# Patient Record
Sex: MALE | Race: OTHER | Employment: FULL TIME | URBAN - METROPOLITAN AREA
[De-identification: names, ages, dates, MRNs, and addresses within clinical notes are randomized per-mention and may not be internally consistent; named-entity substitution may affect disease eponyms.]

---

## 2018-01-26 ENCOUNTER — OFFICE VISIT (OUTPATIENT)
Dept: INTERNAL MEDICINE CLINIC | Facility: CLINIC | Age: 33
End: 2018-01-26

## 2018-01-26 VITALS
HEIGHT: 73 IN | HEART RATE: 81 BPM | WEIGHT: 204 LBS | BODY MASS INDEX: 27.04 KG/M2 | DIASTOLIC BLOOD PRESSURE: 70 MMHG | SYSTOLIC BLOOD PRESSURE: 138 MMHG

## 2018-01-26 DIAGNOSIS — Z00.00 ANNUAL PHYSICAL EXAM: Primary | ICD-10-CM

## 2018-01-26 DIAGNOSIS — E78.5 HYPERLIPIDEMIA, UNSPECIFIED HYPERLIPIDEMIA TYPE: ICD-10-CM

## 2018-01-26 DIAGNOSIS — L98.9 SKIN LESION OF BACK: ICD-10-CM

## 2018-01-26 DIAGNOSIS — K21.9 GASTROESOPHAGEAL REFLUX DISEASE, ESOPHAGITIS PRESENCE NOT SPECIFIED: ICD-10-CM

## 2018-01-26 PROCEDURE — 99395 PREV VISIT EST AGE 18-39: CPT | Performed by: INTERNAL MEDICINE

## 2018-01-26 RX ORDER — CHLORAL HYDRATE 500 MG
1000 CAPSULE ORAL DAILY
COMMUNITY
End: 2019-12-08

## 2018-01-26 RX ORDER — OMEPRAZOLE 40 MG/1
40 CAPSULE, DELAYED RELEASE ORAL DAILY
COMMUNITY
End: 2019-04-05

## 2018-01-26 RX ORDER — BIOTIN 1 MG
1 TABLET ORAL DAILY
COMMUNITY
End: 2019-12-08

## 2018-01-26 NOTE — PROGRESS NOTES
HPI:    Patient ID: Sita Thomason is a 28year old male.     HPI  Patient comes in today for first time to establish care and annual physical exam patient has history of GERD with possible Logan's esophagitis or the beginning of it that he was told i HERNIA SURGERY   Family History   Problem Relation Age of Onset   • Diabetes Mother    • Diabetes Maternal Grandmother    • Heart Disorder Maternal Grandmother       Social History: Smoking status: Never Smoker this encounter       Imaging & Referrals:  [facility] - INTERNAL  [facility] - INTERNAL        RE#[id number]

## 2018-03-08 ENCOUNTER — OFFICE VISIT (OUTPATIENT)
Dept: DERMATOLOGY CLINIC | Facility: CLINIC | Age: 33
End: 2018-03-08

## 2018-03-08 DIAGNOSIS — D18.01 HEMANGIOMA OF SKIN AND SUBCUTANEOUS TISSUE: ICD-10-CM

## 2018-03-08 DIAGNOSIS — D23.5 BENIGN NEOPLASM OF SKIN OF TRUNK, EXCEPT SCROTUM: ICD-10-CM

## 2018-03-08 DIAGNOSIS — D48.5 NEOPLASM OF UNCERTAIN BEHAVIOR OF SKIN: Primary | ICD-10-CM

## 2018-03-08 DIAGNOSIS — T14.8XXA EXCORIATION: ICD-10-CM

## 2018-03-08 PROCEDURE — 11100 BIOPSY OF SKIN LESION: CPT | Performed by: DERMATOLOGY

## 2018-03-08 PROCEDURE — 99212 OFFICE O/P EST SF 10 MIN: CPT | Performed by: DERMATOLOGY

## 2018-03-08 PROCEDURE — 88305 TISSUE EXAM BY PATHOLOGIST: CPT | Performed by: DERMATOLOGY

## 2018-03-08 PROCEDURE — 99202 OFFICE O/P NEW SF 15 MIN: CPT | Performed by: DERMATOLOGY

## 2018-03-08 RX ORDER — RANITIDINE 150 MG/1
150 CAPSULE ORAL 2 TIMES DAILY
COMMUNITY
End: 2019-12-08

## 2018-03-08 NOTE — PROGRESS NOTES
HPI:     Chief Complaint     Lesion        HPI     Lesion    Additional comments: New pt. Pt presents with lesion on lower back for x months, pt states lesion is sensitive to touch, otherwise no symptoms. Pt denies personal and family hx of skin cancer. education: N/A  Number of children: N/A     Occupational History  None on file     Social History Main Topics   Smoking status: Never Smoker    Smokeless tobacco: Never Used    Alcohol use Yes    Comment: socially    Drug use: No    Sexual activity: Not on that  it is absorbed and working and to reapply it every few hours.       Orders Placed This Encounter      BIOPSY OF SKIN LESION      Specimen to Pathology, Tissue [[facility] Pt to [facility] lab]    Results From Past 48 Hours:  No results found for this or any prev

## 2018-03-08 NOTE — PROGRESS NOTES
Past Medical History:   Diagnosis Date   • Esophageal reflux      Past Surgical History:  No date: HERNIA SURGERY    Social History  Social History   Marital status: Single  Spouse name: N/A    Years of education: N/A  Number of children: N/A     Occupatio

## 2018-04-11 ENCOUNTER — OFFICE VISIT (OUTPATIENT)
Dept: GASTROENTEROLOGY | Facility: CLINIC | Age: 33
End: 2018-04-11

## 2018-04-11 VITALS
HEART RATE: 87 BPM | HEIGHT: 72.05 IN | SYSTOLIC BLOOD PRESSURE: 123 MMHG | DIASTOLIC BLOOD PRESSURE: 74 MMHG | WEIGHT: 198 LBS | BODY MASS INDEX: 26.82 KG/M2

## 2018-04-11 DIAGNOSIS — K21.00 GASTROESOPHAGEAL REFLUX DISEASE WITH ESOPHAGITIS: Primary | ICD-10-CM

## 2018-04-11 PROCEDURE — 99243 OFF/OP CNSLTJ NEW/EST LOW 30: CPT | Performed by: INTERNAL MEDICINE

## 2018-04-11 PROCEDURE — 99212 OFFICE O/P EST SF 10 MIN: CPT | Performed by: INTERNAL MEDICINE

## 2018-04-15 NOTE — PROGRESS NOTES
HPI:    Patient ID: Etelvina Carmen is a 35year old male. HPI  The patient is referred by Dr. Edgard Fu. A few years prior the patient presented with \"massive heartburn\" that would occur on \"a constant basis\".   He underwent an upper endoscopy Prescriptions:  RaNITidine HCl 150 MG Oral Cap Take 150 mg by mouth 2 (two) times daily. Disp:  Rfl:    Omeprazole 40 MG Oral Capsule Delayed Release Take 40 mg by mouth daily.  Disp:  Rfl:    Multiple Vitamins-Minerals (MENS MULTI VITAMIN & MINERAL) Oral T biopsies of the esophagus were negative for ulceration or intestinal metaplasia. With dietary and lifestyle modification, the patient's symptoms have significantly improved.   His acid suppression has been lowered from 40 mg daily of omeprazole to 150 mg o

## 2018-04-15 NOTE — PATIENT INSTRUCTIONS
1.  Continue dietary and lifestyle modification for reflux. 2.  Please forward a copy of your endoscopic photographs to me for review. 3.  May stop the ranitidine and assess symptoms. 3.  Please contact me with recurrent symptoms.

## 2018-06-12 ENCOUNTER — OFFICE VISIT (OUTPATIENT)
Dept: INTERNAL MEDICINE CLINIC | Facility: CLINIC | Age: 33
End: 2018-06-12

## 2018-06-12 VITALS
HEART RATE: 78 BPM | SYSTOLIC BLOOD PRESSURE: 132 MMHG | DIASTOLIC BLOOD PRESSURE: 78 MMHG | BODY MASS INDEX: 26.51 KG/M2 | WEIGHT: 200 LBS | HEIGHT: 73 IN | TEMPERATURE: 98 F | RESPIRATION RATE: 18 BRPM

## 2018-06-12 DIAGNOSIS — H93.8X1 CONGESTION OF RIGHT EAR: ICD-10-CM

## 2018-06-12 DIAGNOSIS — H66.90 ACUTE OTITIS MEDIA, UNSPECIFIED OTITIS MEDIA TYPE: Primary | ICD-10-CM

## 2018-06-12 PROBLEM — K21.9 GERD (GASTROESOPHAGEAL REFLUX DISEASE): Status: ACTIVE | Noted: 2018-06-12

## 2018-06-12 PROCEDURE — 99212 OFFICE O/P EST SF 10 MIN: CPT | Performed by: INTERNAL MEDICINE

## 2018-06-12 PROCEDURE — 99213 OFFICE O/P EST LOW 20 MIN: CPT | Performed by: INTERNAL MEDICINE

## 2018-06-12 RX ORDER — AZITHROMYCIN 250 MG/1
TABLET, FILM COATED ORAL
Qty: 6 TABLET | Refills: 0 | Status: SHIPPED | OUTPATIENT
Start: 2018-06-12 | End: 2018-09-13 | Stop reason: ALTCHOICE

## 2018-06-12 NOTE — PATIENT INSTRUCTIONS
ASSESSMENT/PLAN:   Acute otitis media, unspecified otitis media type  (primary encounter diagnosis)-will cover with antibiotics Z-Dean take as prescribed let us know if not better to see ENT if not better  Congestion of right ear as need decongestant

## 2018-06-12 NOTE — PROGRESS NOTES
HPI:    Patient ID: Susana Blount is a 35year old male.     HPI  Patient comes in with complaint of right ear feeling clogged and some discomfort and pain he went initially to immediate care a few days ago was given some eardrops continues with the sa Constitutional: He is oriented to person, place, and time. He appears well-developed and well-nourished. HENT:   Head: Normocephalic and atraumatic.    Rt ear swelling TM ,    Eyes: Conjunctivae and EOM are normal. Pupils are equal, round, and reactive

## 2018-09-13 ENCOUNTER — OFFICE VISIT (OUTPATIENT)
Dept: AUDIOLOGY | Facility: CLINIC | Age: 33
End: 2018-09-13
Payer: COMMERCIAL

## 2018-09-13 ENCOUNTER — OFFICE VISIT (OUTPATIENT)
Dept: OTOLARYNGOLOGY | Facility: CLINIC | Age: 33
End: 2018-09-13
Payer: COMMERCIAL

## 2018-09-13 VITALS
WEIGHT: 200 LBS | BODY MASS INDEX: 26.51 KG/M2 | SYSTOLIC BLOOD PRESSURE: 129 MMHG | DIASTOLIC BLOOD PRESSURE: 81 MMHG | TEMPERATURE: 99 F | HEIGHT: 73 IN

## 2018-09-13 DIAGNOSIS — H90.11 CONDUCTIVE HEARING LOSS OF RIGHT EAR WITH UNRESTRICTED HEARING OF LEFT EAR: Primary | ICD-10-CM

## 2018-09-13 DIAGNOSIS — H93.11 TINNITUS OF RIGHT EAR: Primary | ICD-10-CM

## 2018-09-13 PROCEDURE — 92567 TYMPANOMETRY: CPT | Performed by: AUDIOLOGIST

## 2018-09-13 PROCEDURE — 99243 OFF/OP CNSLTJ NEW/EST LOW 30: CPT | Performed by: OTOLARYNGOLOGY

## 2018-09-13 PROCEDURE — 92557 COMPREHENSIVE HEARING TEST: CPT | Performed by: AUDIOLOGIST

## 2018-09-13 PROCEDURE — 99212 OFFICE O/P EST SF 10 MIN: CPT | Performed by: OTOLARYNGOLOGY

## 2018-09-13 RX ORDER — METHYLPREDNISOLONE 4 MG/1
TABLET ORAL
Qty: 1 PACKAGE | Refills: 0 | Status: SHIPPED | OUTPATIENT
Start: 2018-09-13 | End: 2019-04-05

## 2018-09-13 RX ORDER — FLUTICASONE PROPIONATE 50 MCG
2 SPRAY, SUSPENSION (ML) NASAL DAILY
Qty: 1 BOTTLE | Refills: 3 | Status: SHIPPED | OUTPATIENT
Start: 2018-09-13 | End: 2019-04-05

## 2018-09-13 NOTE — PROGRESS NOTES
AUDIOGRAM     Harpreet Sanchez [facility] was referred for testing by Jaymie Newman V for clogged feeling in the right ear. 3/31/1985  [id number]      Otoscopic inspection: right ear no cerumen; left ear no cerumen.        Tests/Procedures  Patient was tested

## 2018-09-13 NOTE — PROGRESS NOTES
Evans Blanco is a 35year old male.  Patient presents with:  Ear Problem: clogged right ear and ringing for a few months, pt was oral antibiotics and ear drops with no change in symptoms     HPI:   He has been experiencing a problem with fullness in h External nose - Normal. Nasal septum -deviated to the right  turbinates - Normal   Neurological Normal Memory - Normal. Cranial nerves - Cranial nerves II through XII grossly intact.    Neck Exam Normal Inspection - Normal. Palpation - Normal. Parotid gland

## 2018-10-05 ENCOUNTER — OFFICE VISIT (OUTPATIENT)
Dept: OTOLARYNGOLOGY | Facility: CLINIC | Age: 33
End: 2018-10-05
Payer: COMMERCIAL

## 2018-10-05 VITALS — DIASTOLIC BLOOD PRESSURE: 80 MMHG | SYSTOLIC BLOOD PRESSURE: 127 MMHG | HEART RATE: 80 BPM

## 2018-10-05 DIAGNOSIS — H73.891 RETRACTION POCKET OF TYMPANIC MEMBRANE, RIGHT: Primary | ICD-10-CM

## 2018-10-05 PROCEDURE — 99212 OFFICE O/P EST SF 10 MIN: CPT | Performed by: OTOLARYNGOLOGY

## 2018-10-05 PROCEDURE — 99213 OFFICE O/P EST LOW 20 MIN: CPT | Performed by: OTOLARYNGOLOGY

## 2018-10-05 NOTE — PROGRESS NOTES
Loy Burton is a 35year old male.  Patient presents with:  Ear Problem: tinnnitus f/u ,per pt slight improvement, pt still has some pressure and ringing in ear    HPI:   He feels that the ringing in his ear has improved only slightly in the fullness Normal   Neurological Normal Memory - Normal. Cranial nerves - Cranial nerves II through XII grossly intact.    Neck Exam Normal Inspection - Normal. Palpation - Normal. Parotid gland - Normal. Thyroid gland - Normal.   Psychiatric Normal Orientation - Marcelle Berman

## 2019-04-05 ENCOUNTER — OFFICE VISIT (OUTPATIENT)
Dept: AUDIOLOGY | Facility: CLINIC | Age: 34
End: 2019-04-05
Payer: COMMERCIAL

## 2019-04-05 ENCOUNTER — OFFICE VISIT (OUTPATIENT)
Dept: OTOLARYNGOLOGY | Facility: CLINIC | Age: 34
End: 2019-04-05
Payer: COMMERCIAL

## 2019-04-05 ENCOUNTER — OFFICE VISIT (OUTPATIENT)
Dept: INTERNAL MEDICINE CLINIC | Facility: CLINIC | Age: 34
End: 2019-04-05
Payer: COMMERCIAL

## 2019-04-05 VITALS
BODY MASS INDEX: 29.03 KG/M2 | HEIGHT: 73 IN | OXYGEN SATURATION: 99 % | SYSTOLIC BLOOD PRESSURE: 125 MMHG | HEART RATE: 75 BPM | RESPIRATION RATE: 17 BRPM | DIASTOLIC BLOOD PRESSURE: 82 MMHG | TEMPERATURE: 98 F | WEIGHT: 219 LBS

## 2019-04-05 DIAGNOSIS — K21.9 GASTROESOPHAGEAL REFLUX DISEASE, ESOPHAGITIS PRESENCE NOT SPECIFIED: ICD-10-CM

## 2019-04-05 DIAGNOSIS — H73.891 RETRACTION POCKET OF TYMPANIC MEMBRANE, RIGHT: ICD-10-CM

## 2019-04-05 DIAGNOSIS — Z00.00 ANNUAL PHYSICAL EXAM: Primary | ICD-10-CM

## 2019-04-05 DIAGNOSIS — H91.93 BILATERAL HEARING LOSS, UNSPECIFIED HEARING LOSS TYPE: Primary | ICD-10-CM

## 2019-04-05 DIAGNOSIS — H90.11 CONDUCTIVE HEARING LOSS OF RIGHT EAR WITH UNRESTRICTED HEARING OF LEFT EAR: Primary | ICD-10-CM

## 2019-04-05 DIAGNOSIS — R10.12 ABDOMINAL DISCOMFORT IN LEFT UPPER QUADRANT: ICD-10-CM

## 2019-04-05 PROCEDURE — 99213 OFFICE O/P EST LOW 20 MIN: CPT | Performed by: OTOLARYNGOLOGY

## 2019-04-05 PROCEDURE — 99212 OFFICE O/P EST SF 10 MIN: CPT | Performed by: OTOLARYNGOLOGY

## 2019-04-05 PROCEDURE — 99395 PREV VISIT EST AGE 18-39: CPT | Performed by: INTERNAL MEDICINE

## 2019-04-05 PROCEDURE — 92567 TYMPANOMETRY: CPT | Performed by: AUDIOLOGIST

## 2019-04-05 PROCEDURE — 92557 COMPREHENSIVE HEARING TEST: CPT | Performed by: AUDIOLOGIST

## 2019-04-05 RX ORDER — TURMERIC ROOT EXTRACT 500 MG
TABLET ORAL
COMMUNITY
Start: 2019-03-01 | End: 2019-12-08

## 2019-04-05 RX ORDER — FAMOTIDINE 10 MG/ML
INJECTION, SOLUTION INTRAVENOUS
COMMUNITY
Start: 2019-01-01 | End: 2019-12-08

## 2019-04-05 NOTE — PROGRESS NOTES
Barbara Nurse is a 29year old male. Patient presents with:  Ear Problem: 6 month follow up on right ear: [pt reports no improvement    HPI:   He is in follow-up of an attic retraction in his right ear.   He still feels that the ear feels up at times w Nasal Normal External nose - Normal. Nasal septum - Normal, Turbinates - Normal   Neurological Normal Memory - Normal. Cranial nerves - Cranial nerves II through XII grossly intact.    Neck Exam Normal Inspection - Normal. Palpation - Normal. Parotid gla

## 2019-04-05 NOTE — PROGRESS NOTES
HPI:    Patient ID: Tala Mcdonald is a 29year old male. HPI  Okay he is GERD symptoms are much better he is watching his diet he is only taking famotidine as needed he had seen GI.   Patient also with chronic right ear discomfort has seen ENT only SWELLING    HISTORY:  Past Medical History:   Diagnosis Date   • Esophageal reflux       Past Surgical History:   Procedure Laterality Date   • EGD  03/2015    hiatal hernia @ [facility]   • HERNIA SURGERY        Family History   Problem Relation Ag likely was muscular possible injury or muscle strain benign exam no bulging so does not look like a hernia    Orders Placed This Encounter      CBC W Differential W Platelet [E] Normal      Comp Metabolic Panel (14) [E] Normal      Lipid Panel [E], normal

## 2019-04-08 ENCOUNTER — LAB ENCOUNTER (OUTPATIENT)
Dept: LAB | Facility: HOSPITAL | Age: 34
End: 2019-04-08
Attending: INTERNAL MEDICINE
Payer: COMMERCIAL

## 2019-04-08 DIAGNOSIS — R73.09 ELEVATED GLUCOSE: ICD-10-CM

## 2019-04-08 DIAGNOSIS — Z00.00 ANNUAL PHYSICAL EXAM: ICD-10-CM

## 2019-04-08 PROCEDURE — 85025 COMPLETE CBC W/AUTO DIFF WBC: CPT

## 2019-04-08 PROCEDURE — 81003 URINALYSIS AUTO W/O SCOPE: CPT

## 2019-04-08 PROCEDURE — 83721 ASSAY OF BLOOD LIPOPROTEIN: CPT

## 2019-04-08 PROCEDURE — 80061 LIPID PANEL: CPT

## 2019-04-08 PROCEDURE — 84443 ASSAY THYROID STIM HORMONE: CPT

## 2019-04-08 PROCEDURE — 36415 COLL VENOUS BLD VENIPUNCTURE: CPT

## 2019-04-08 PROCEDURE — 83036 HEMOGLOBIN GLYCOSYLATED A1C: CPT

## 2019-04-08 PROCEDURE — 80053 COMPREHEN METABOLIC PANEL: CPT

## 2019-04-08 NOTE — PROGRESS NOTES
The BS elevated  and cholesterol and triglycerides are very high, will need to watch diet, cut down on carbs, rice, pasta, potatoes, one cup per serving, bread 2 slices wheat better than white, no sodas or othr sugary drinks, cut down on fried fatty food,

## 2019-04-09 NOTE — PROGRESS NOTES
AUDIOLOGY REPORT      Harpreet Gilmore is a 29year old male     Referring Provider: Larissa Hebert   YOB: 1985  Medical Record: [id number]      Patient Hearing History:  Patient is here for follow up audiogram.   Previous hearing testing w

## 2019-08-23 ENCOUNTER — PATIENT MESSAGE (OUTPATIENT)
Dept: INTERNAL MEDICINE CLINIC | Facility: CLINIC | Age: 34
End: 2019-08-23

## 2019-08-23 DIAGNOSIS — E78.5 HYPERLIPIDEMIA, UNSPECIFIED HYPERLIPIDEMIA TYPE: Primary | ICD-10-CM

## 2019-08-23 NOTE — TELEPHONE ENCOUNTER
From: Teresia Goldberg  To: Yenifer Casarez MD  Sent: 8/23/2019 8:09 AM CDT  Subject: Visit Follow-up Question    Hi Dr Fischer Shoulder:    It has been three months since my blood was drawn to test triglyceride levels. I have been eating healthy and exercising.

## 2019-08-23 NOTE — TELEPHONE ENCOUNTER
pls see pt mychart message below and advise if ok for a Lipid panel.  I have pended the order for your review and approval.

## 2019-08-27 ENCOUNTER — TELEPHONE (OUTPATIENT)
Dept: INTERNAL MEDICINE CLINIC | Facility: CLINIC | Age: 34
End: 2019-08-27

## 2019-08-27 ENCOUNTER — LAB ENCOUNTER (OUTPATIENT)
Dept: LAB | Facility: HOSPITAL | Age: 34
End: 2019-08-27
Attending: INTERNAL MEDICINE
Payer: COMMERCIAL

## 2019-08-27 DIAGNOSIS — E78.5 HYPERLIPIDEMIA: Primary | ICD-10-CM

## 2019-08-27 LAB
CHOLEST SMN-MCNC: 242 MG/DL (ref ?–200)
HDLC SERPL-MCNC: 39 MG/DL (ref 40–59)
LDLC SERPL CALC-MCNC: 156 MG/DL (ref ?–100)
NONHDLC SERPL-MCNC: 203 MG/DL (ref ?–130)
PATIENT FASTING: YES
TRIGL SERPL-MCNC: 235 MG/DL (ref 30–149)
VLDLC SERPL CALC-MCNC: 47 MG/DL (ref 0–30)

## 2019-08-27 PROCEDURE — 80061 LIPID PANEL: CPT

## 2019-08-27 PROCEDURE — 36415 COLL VENOUS BLD VENIPUNCTURE: CPT

## 2019-08-27 NOTE — TELEPHONE ENCOUNTER
Belle from [address] states the patient is still there waiting to have Labs done been there since 9:40 am this morning         Please advise     # [phone]

## 2019-08-27 NOTE — TELEPHONE ENCOUNTER
patient is currently at [facility] for labs as instruction please sign pended order for lipids

## 2019-08-27 NOTE — TELEPHONE ENCOUNTER
Belle from the [facility] lab states the lab needs an order for the lipid panel to be drawn, the patient has already had it drawn per Dr. Houston Late order. Order signed per Dr. Khanh Ruvalcaba.

## 2019-09-17 ENCOUNTER — MED REC SCAN ONLY (OUTPATIENT)
Dept: INTERNAL MEDICINE CLINIC | Facility: CLINIC | Age: 34
End: 2019-09-17

## 2019-09-17 ENCOUNTER — OFFICE VISIT (OUTPATIENT)
Dept: INTERNAL MEDICINE CLINIC | Facility: CLINIC | Age: 34
End: 2019-09-17
Payer: COMMERCIAL

## 2019-09-17 VITALS
TEMPERATURE: 98 F | HEIGHT: 73 IN | DIASTOLIC BLOOD PRESSURE: 85 MMHG | SYSTOLIC BLOOD PRESSURE: 143 MMHG | WEIGHT: 195 LBS | OXYGEN SATURATION: 99 % | BODY MASS INDEX: 25.84 KG/M2 | RESPIRATION RATE: 18 BRPM | HEART RATE: 82 BPM

## 2019-09-17 DIAGNOSIS — R10.32 ABDOMINAL PAIN, LLQ: Primary | ICD-10-CM

## 2019-09-17 PROBLEM — R10.12 ABDOMINAL PAIN, CHRONIC, LEFT UPPER QUADRANT: Status: ACTIVE | Noted: 2019-09-17

## 2019-09-17 PROBLEM — G89.29 ABDOMINAL PAIN, CHRONIC, LEFT UPPER QUADRANT: Status: ACTIVE | Noted: 2019-09-17

## 2019-09-17 PROCEDURE — 99213 OFFICE O/P EST LOW 20 MIN: CPT | Performed by: INTERNAL MEDICINE

## 2019-09-17 PROCEDURE — 90686 IIV4 VACC NO PRSV 0.5 ML IM: CPT | Performed by: INTERNAL MEDICINE

## 2019-09-17 PROCEDURE — 90471 IMMUNIZATION ADMIN: CPT | Performed by: INTERNAL MEDICINE

## 2019-09-23 NOTE — PROGRESS NOTES
HPI:    Patient ID: Sita Thomason is a 29year old male.     HPI   Comes in for follow-up he continues to have abdominal pain left lower quadrant area he is seen GI was given omeprazole is been taking but has not really helped with it was noted to be g Cap Take 1 tablet by mouth daily. Disp:  Rfl:    omega-3 fatty acids 1000 MG Oral Cap Take 1,000 mg by mouth daily.  Disp:  Rfl:      Allergies:  Ceclor                  NAUSEA AND VOMITING, PAIN, SWELLING  Cefaclor                RASH   PHYSICAL EXAM:   Ph

## 2019-09-28 ENCOUNTER — HOSPITAL ENCOUNTER (OUTPATIENT)
Dept: CT IMAGING | Facility: HOSPITAL | Age: 34
Discharge: HOME | End: 2019-09-28
Attending: INTERNAL MEDICINE
Payer: COMMERCIAL

## 2019-09-28 DIAGNOSIS — R10.32 ABDOMINAL PAIN, LLQ: ICD-10-CM

## 2019-09-28 PROCEDURE — 74177 CT ABD & PELVIS W/CONTRAST: CPT | Performed by: INTERNAL MEDICINE

## 2019-09-28 PROCEDURE — 82565 ASSAY OF CREATININE: CPT

## 2019-10-11 ENCOUNTER — OFFICE VISIT (OUTPATIENT)
Dept: OTOLARYNGOLOGY | Facility: CLINIC | Age: 34
End: 2019-10-11
Payer: COMMERCIAL

## 2019-10-11 VITALS
SYSTOLIC BLOOD PRESSURE: 118 MMHG | HEIGHT: 73 IN | BODY MASS INDEX: 25.84 KG/M2 | DIASTOLIC BLOOD PRESSURE: 72 MMHG | TEMPERATURE: 97 F | WEIGHT: 195 LBS

## 2019-10-11 DIAGNOSIS — H73.891 RETRACTION POCKET OF TYMPANIC MEMBRANE, RIGHT: Primary | ICD-10-CM

## 2019-10-11 PROCEDURE — 99213 OFFICE O/P EST LOW 20 MIN: CPT | Performed by: OTOLARYNGOLOGY

## 2019-10-11 NOTE — PROGRESS NOTES
Vanessa Trevino is a 29year old male. Patient presents with: Follow - Up: 6 month follow up- retraction pocket of right tymphanic membrane    HPI:   He is in follow-up of right-sided retraction pocket.   He is not having any pain or other symptoms    C - Normal    Nasal Normal External nose - Normal. Nasal septum - Normal, Turbinates - Normal   Neurological Normal Memory - Normal. Cranial nerves - Cranial nerves II through XII grossly intact.    Neck Exam Normal Inspection - Normal. Palpation - Normal. Pa

## 2019-12-03 ENCOUNTER — OFFICE VISIT (OUTPATIENT)
Dept: INTERNAL MEDICINE CLINIC | Facility: CLINIC | Age: 34
End: 2019-12-03
Payer: COMMERCIAL

## 2019-12-03 VITALS
BODY MASS INDEX: 26.11 KG/M2 | OXYGEN SATURATION: 98 % | TEMPERATURE: 97 F | SYSTOLIC BLOOD PRESSURE: 136 MMHG | HEART RATE: 66 BPM | RESPIRATION RATE: 16 BRPM | DIASTOLIC BLOOD PRESSURE: 82 MMHG | WEIGHT: 197 LBS | HEIGHT: 73 IN

## 2019-12-03 DIAGNOSIS — N28.89: ICD-10-CM

## 2019-12-03 DIAGNOSIS — R10.12 ABDOMINAL DISCOMFORT IN LEFT UPPER QUADRANT: Primary | ICD-10-CM

## 2019-12-03 DIAGNOSIS — K21.9 GASTROESOPHAGEAL REFLUX DISEASE, ESOPHAGITIS PRESENCE NOT SPECIFIED: ICD-10-CM

## 2019-12-03 DIAGNOSIS — E78.5 HYPERLIPIDEMIA, UNSPECIFIED HYPERLIPIDEMIA TYPE: ICD-10-CM

## 2019-12-03 PROCEDURE — 99214 OFFICE O/P EST MOD 30 MIN: CPT | Performed by: INTERNAL MEDICINE

## 2019-12-03 PROCEDURE — 36415 COLL VENOUS BLD VENIPUNCTURE: CPT | Performed by: INTERNAL MEDICINE

## 2019-12-03 NOTE — PROGRESS NOTES
HPI:    Patient ID: Etelvina Carmen is a 29year old male.     HPI   Is in for follow-up he continues to have this left side discomfort at times itching he had CT scan abdomen pelvis which showed malformation of the kidney was supposed to follow with the mg by mouth daily.        Allergies:  Ceclor                  NAUSEA AND VOMITING, PAIN, SWELLING  Cefaclor                RASH    HISTORY:  Past Medical History:   Diagnosis Date   • Esophageal reflux       Past Surgical History:   Procedure Laterality [place] or referral  Gastroesophageal reflux disease, esophagitis presence not specified, stable  Hyperlipidemia, unspecified hyperlipidemia type retest at  Renal position [id number] with urology    Orders Placed This Encounter      Lipid Panel [E], specim

## 2020-01-09 ENCOUNTER — PATIENT MESSAGE (OUTPATIENT)
Dept: ADMINISTRATIVE | Age: 35
End: 2020-01-09

## 2020-01-16 NOTE — TELEPHONE ENCOUNTER
Sent patient my chart message. Schedule for 1/29/20 reopened.      Future Appointments   Date Time Provider Duong Peralta   1/29/2020  9:00 AM Malinda Ortega MD [facility]   4/10/2020 11:00 AM Harjit Arroyo MD [address] [facility]

## 2020-01-29 ENCOUNTER — OFFICE VISIT (OUTPATIENT)
Dept: SURGERY | Facility: CLINIC | Age: 35
End: 2020-01-29
Payer: COMMERCIAL

## 2020-01-29 VITALS
SYSTOLIC BLOOD PRESSURE: 143 MMHG | TEMPERATURE: 98 F | WEIGHT: 200 LBS | HEART RATE: 128 BPM | HEIGHT: 73 IN | DIASTOLIC BLOOD PRESSURE: 91 MMHG | BODY MASS INDEX: 26.51 KG/M2

## 2020-01-29 DIAGNOSIS — Q63.2 MALROTATION OF KIDNEY: Primary | ICD-10-CM

## 2020-01-29 PROCEDURE — 99243 OFF/OP CNSLTJ NEW/EST LOW 30: CPT | Performed by: UROLOGY

## 2020-01-29 NOTE — PROGRESS NOTES
[facility] Urology  Initial Office Consultation    HPI:   Cherise Kendrick is a 29year old male here today for consultation at the request of, and a copy of this note will be sent to, Janis Sarabia MD.    1. Malrotation of Right Kidney  Patient was Size: large)   Pulse (!) 128   Temp 98.3 °F (36.8 °C) (Oral)   Ht 6' 1\" (1.854 m)   Wt 200 lb (90.7 kg)   BMI 26.39 kg/m²     Physical Exam    Constitutional: He is oriented to person, place, and time and thin.  He appears well-developed and well-nourished at the UPJ raising the possibility of partial UPJ obstruction. Reviewed findings with patient at length. We discussed relevant anatomy and physiology.   He was informed that this is a congenital anomaly of the kidney that was incidentally found upon wor

## 2020-12-10 ENCOUNTER — OFFICE VISIT (OUTPATIENT)
Dept: INTERNAL MEDICINE CLINIC | Facility: CLINIC | Age: 35
End: 2020-12-10
Payer: COMMERCIAL

## 2020-12-10 VITALS
SYSTOLIC BLOOD PRESSURE: 146 MMHG | TEMPERATURE: 98 F | OXYGEN SATURATION: 99 % | DIASTOLIC BLOOD PRESSURE: 82 MMHG | HEART RATE: 78 BPM | HEIGHT: 73 IN | RESPIRATION RATE: 18 BRPM | BODY MASS INDEX: 25.71 KG/M2 | WEIGHT: 194 LBS

## 2020-12-10 DIAGNOSIS — S19.9XXA INJURY OF ANTERIOR NECK, INITIAL ENCOUNTER: ICD-10-CM

## 2020-12-10 DIAGNOSIS — G89.29 ABDOMINAL PAIN, CHRONIC, LEFT UPPER QUADRANT: ICD-10-CM

## 2020-12-10 DIAGNOSIS — N28.9 ABNORMAL RENAL FUNCTION: ICD-10-CM

## 2020-12-10 DIAGNOSIS — E78.5 HYPERLIPIDEMIA, UNSPECIFIED HYPERLIPIDEMIA TYPE: ICD-10-CM

## 2020-12-10 DIAGNOSIS — Q63.2 MALROTATION OF KIDNEY: ICD-10-CM

## 2020-12-10 DIAGNOSIS — R10.12 ABDOMINAL PAIN, CHRONIC, LEFT UPPER QUADRANT: ICD-10-CM

## 2020-12-10 DIAGNOSIS — Z00.00 ANNUAL PHYSICAL EXAM: Primary | ICD-10-CM

## 2020-12-10 PROCEDURE — 3079F DIAST BP 80-89 MM HG: CPT | Performed by: INTERNAL MEDICINE

## 2020-12-10 PROCEDURE — 36415 COLL VENOUS BLD VENIPUNCTURE: CPT | Performed by: INTERNAL MEDICINE

## 2020-12-10 PROCEDURE — 3077F SYST BP >= 140 MM HG: CPT | Performed by: INTERNAL MEDICINE

## 2020-12-10 PROCEDURE — 99395 PREV VISIT EST AGE 18-39: CPT | Performed by: INTERNAL MEDICINE

## 2020-12-10 PROCEDURE — 3008F BODY MASS INDEX DOCD: CPT | Performed by: INTERNAL MEDICINE

## 2020-12-10 NOTE — PROGRESS NOTES
HPI:    Patient ID: Peña Moses is a 28year old male. HPI  Comes in for annual physical overall doing okay he says he is not taking the cholesterol medication has been trying to watch his diet closer.   Also today complains of some throat irritat reflux       Past Surgical History:   Procedure Laterality Date   • EGD  03/2015    hiatal hernia @ [facility]   • HERNIA SURGERY        Family History   Problem Relation Age of Onset   • Diabetes Mother    • Diabetes Maternal Grandmother    • Heart now     Orders Placed This Encounter      CBC W Differential W Platelet [E] Specimen      Comp Metabolic Panel (14) [E] Specimen      Lipid Panel [E], specimen      TSH W Reflex To Free T4 [E], specimen      Urinalysis, Routine, specimen      Meds This [employer]

## 2020-12-29 ENCOUNTER — OFFICE VISIT (OUTPATIENT)
Dept: OTOLARYNGOLOGY | Facility: CLINIC | Age: 35
End: 2020-12-29
Payer: COMMERCIAL

## 2020-12-29 VITALS
WEIGHT: 194 LBS | TEMPERATURE: 97 F | BODY MASS INDEX: 25.71 KG/M2 | HEIGHT: 73 IN | SYSTOLIC BLOOD PRESSURE: 138 MMHG | DIASTOLIC BLOOD PRESSURE: 78 MMHG

## 2020-12-29 DIAGNOSIS — H73.891 RETRACTION POCKET OF TYMPANIC MEMBRANE, RIGHT: Primary | ICD-10-CM

## 2020-12-29 DIAGNOSIS — H93.13 TINNITUS OF BOTH EARS: ICD-10-CM

## 2020-12-29 PROCEDURE — 3078F DIAST BP <80 MM HG: CPT | Performed by: OTOLARYNGOLOGY

## 2020-12-29 PROCEDURE — 3075F SYST BP GE 130 - 139MM HG: CPT | Performed by: OTOLARYNGOLOGY

## 2020-12-29 PROCEDURE — 3008F BODY MASS INDEX DOCD: CPT | Performed by: OTOLARYNGOLOGY

## 2020-12-29 PROCEDURE — 99213 OFFICE O/P EST LOW 20 MIN: CPT | Performed by: OTOLARYNGOLOGY

## 2020-12-30 NOTE — PROGRESS NOTES
Vanessa Trevino is a 28year old male. Patient presents with:  Ear Problem: Patient here for a follow up regarding r Retraction pocket of tympanic membrane, per pt no changes     HPI:   He has been experiencing a lot of ringing in his ears.   He has had person & situation. Appropriate mood and affect. Lymph Detail Normal Submental. Submandibular. Anterior cervical. Posterior cervical. Supraclavicular.    Eyes Normal Conjunctiva - Right: Normal, Left: Normal. Pupil - Right: Normal, Left: Normal.    Ears N

## 2021-03-02 ENCOUNTER — OFFICE VISIT (OUTPATIENT)
Dept: OTOLARYNGOLOGY | Facility: CLINIC | Age: 36
End: 2021-03-02
Payer: COMMERCIAL

## 2021-03-02 VITALS
DIASTOLIC BLOOD PRESSURE: 70 MMHG | SYSTOLIC BLOOD PRESSURE: 120 MMHG | HEIGHT: 73 IN | TEMPERATURE: 98 F | WEIGHT: 190 LBS | BODY MASS INDEX: 25.18 KG/M2

## 2021-03-02 DIAGNOSIS — H73.891 RETRACTION POCKET OF TYMPANIC MEMBRANE, RIGHT: Primary | ICD-10-CM

## 2021-03-02 DIAGNOSIS — J03.90 TONSILLITIS: ICD-10-CM

## 2021-03-02 PROCEDURE — 99213 OFFICE O/P EST LOW 20 MIN: CPT | Performed by: OTOLARYNGOLOGY

## 2021-03-02 PROCEDURE — 3008F BODY MASS INDEX DOCD: CPT | Performed by: OTOLARYNGOLOGY

## 2021-03-02 PROCEDURE — 3078F DIAST BP <80 MM HG: CPT | Performed by: OTOLARYNGOLOGY

## 2021-03-02 PROCEDURE — 3074F SYST BP LT 130 MM HG: CPT | Performed by: OTOLARYNGOLOGY

## 2021-03-02 NOTE — PROGRESS NOTES
Josesito Hadley is a 28year old male. Patient presents with: Follow - Up: 3 month follow up- retraction pocket of right tymphanic memberane-  per pt no changes/improvemenrt of symptoms    HPI:   Recently had some throat pain mainly on the right side. Thyroid gland - Normal.   Psychiatric Normal Orientation - Oriented to time, place, person & situation. Appropriate mood and affect. Lymph Detail Normal Submental. Submandibular. Anterior cervical. Posterior cervical. Supraclavicular.    Eyes Normal Conju

## 2021-05-07 ENCOUNTER — OFFICE VISIT (OUTPATIENT)
Dept: INTERNAL MEDICINE CLINIC | Facility: CLINIC | Age: 36
End: 2021-05-07
Payer: COMMERCIAL

## 2021-05-07 VITALS
BODY MASS INDEX: 23.72 KG/M2 | SYSTOLIC BLOOD PRESSURE: 125 MMHG | HEART RATE: 78 BPM | HEIGHT: 73 IN | DIASTOLIC BLOOD PRESSURE: 82 MMHG | WEIGHT: 179 LBS | OXYGEN SATURATION: 98 %

## 2021-05-07 DIAGNOSIS — G47.30 SLEEP DISORDER BREATHING: ICD-10-CM

## 2021-05-07 DIAGNOSIS — R53.83 FATIGUE, UNSPECIFIED TYPE: ICD-10-CM

## 2021-05-07 DIAGNOSIS — J39.2 DRY THROAT: Primary | ICD-10-CM

## 2021-05-07 DIAGNOSIS — R73.09 ELEVATED GLUCOSE LEVEL: ICD-10-CM

## 2021-05-07 DIAGNOSIS — R06.83 SNORING: ICD-10-CM

## 2021-05-07 PROCEDURE — 3008F BODY MASS INDEX DOCD: CPT | Performed by: INTERNAL MEDICINE

## 2021-05-07 PROCEDURE — 3079F DIAST BP 80-89 MM HG: CPT | Performed by: INTERNAL MEDICINE

## 2021-05-07 PROCEDURE — 3074F SYST BP LT 130 MM HG: CPT | Performed by: INTERNAL MEDICINE

## 2021-05-07 PROCEDURE — 99214 OFFICE O/P EST MOD 30 MIN: CPT | Performed by: INTERNAL MEDICINE

## 2021-05-07 PROCEDURE — 36415 COLL VENOUS BLD VENIPUNCTURE: CPT | Performed by: INTERNAL MEDICINE

## 2021-05-13 NOTE — PROGRESS NOTES
HPI/Subjective:     Patient ID: Taylor Whitehead is a 39year old male. HPI  n today for follow-up is complaining of dry throat he is seen ENT notes and recommendations noted.   Does admit to sleeping with his mouth open and snoring he does not feel re sounds: Normal breath sounds. Abdominal:      General: Bowel sounds are normal.      Palpations: Abdomen is soft. Genitourinary:     Penis: Normal.       Prostate: Normal.      Rectum: Normal.   Musculoskeletal:         General: Normal range of motion.

## 2021-07-21 ENCOUNTER — OFFICE VISIT (OUTPATIENT)
Dept: OTOLARYNGOLOGY | Facility: CLINIC | Age: 36
End: 2021-07-21
Payer: COMMERCIAL

## 2021-07-21 VITALS
DIASTOLIC BLOOD PRESSURE: 87 MMHG | HEIGHT: 73 IN | BODY MASS INDEX: 25.18 KG/M2 | TEMPERATURE: 98 F | SYSTOLIC BLOOD PRESSURE: 155 MMHG | WEIGHT: 190 LBS

## 2021-07-21 DIAGNOSIS — K21.9 GASTROESOPHAGEAL REFLUX DISEASE, UNSPECIFIED WHETHER ESOPHAGITIS PRESENT: ICD-10-CM

## 2021-07-21 DIAGNOSIS — H73.891 RETRACTION POCKET OF TYMPANIC MEMBRANE, RIGHT: Primary | ICD-10-CM

## 2021-07-21 PROCEDURE — 3008F BODY MASS INDEX DOCD: CPT | Performed by: OTOLARYNGOLOGY

## 2021-07-21 PROCEDURE — 3077F SYST BP >= 140 MM HG: CPT | Performed by: OTOLARYNGOLOGY

## 2021-07-21 PROCEDURE — 3079F DIAST BP 80-89 MM HG: CPT | Performed by: OTOLARYNGOLOGY

## 2021-07-21 PROCEDURE — 99213 OFFICE O/P EST LOW 20 MIN: CPT | Performed by: OTOLARYNGOLOGY

## 2021-07-21 RX ORDER — OMEPRAZOLE 40 MG/1
40 CAPSULE, DELAYED RELEASE ORAL DAILY
Qty: 30 CAPSULE | Refills: 2 | Status: SHIPPED | OUTPATIENT
Start: 2021-07-21

## 2021-07-21 NOTE — PROGRESS NOTES
Josesito Hadley is a 39year old male.   Patient presents with:  Ear Problem: c/o right ear pressure and feels full, LOV with  3/2/21  Throat Problem: c/o itchy throat for a while      HISTORY OF PRESENT ILLNESS  7/21/2021  Patient presents for e SURGERY           REVIEW OF SYSTEMS    System Neg/Pos Details   Constitutional Negative Fatigue, fever and weight loss. ENMT Negative Drooling. Eyes Negative Blurred vision and vision changes. Respiratory Negative Dyspnea and wheezing.    Cardio [employer] malignancy at this time looks like chronic irritation. Current Outpatient Medications:   •  Omeprazole 40 MG Oral Capsule Delayed Release, Take 1 capsule (40 mg total) by mouth daily.  Before a meal, Disp: 30 capsule, Rfl: 2  •  Multiple Vit

## 2021-08-12 ENCOUNTER — OFFICE VISIT (OUTPATIENT)
Dept: OTOLARYNGOLOGY | Facility: CLINIC | Age: 36
End: 2021-08-12
Payer: COMMERCIAL

## 2021-08-12 VITALS — BODY MASS INDEX: 25.18 KG/M2 | HEIGHT: 73 IN | WEIGHT: 190 LBS

## 2021-08-12 DIAGNOSIS — K21.9 GASTROESOPHAGEAL REFLUX DISEASE, UNSPECIFIED WHETHER ESOPHAGITIS PRESENT: Primary | ICD-10-CM

## 2021-08-12 PROCEDURE — 3008F BODY MASS INDEX DOCD: CPT | Performed by: OTOLARYNGOLOGY

## 2021-08-12 PROCEDURE — 31575 DIAGNOSTIC LARYNGOSCOPY: CPT | Performed by: OTOLARYNGOLOGY

## 2021-08-12 PROCEDURE — 99213 OFFICE O/P EST LOW 20 MIN: CPT | Performed by: OTOLARYNGOLOGY

## 2021-08-12 NOTE — PROGRESS NOTES
Sharon Garcia is a 39year old male.   Patient presents with:  Throat Problem: pt is here today due to constant irration in his throat, he has been taking medicaiton for a few weeks now and he states it has not been doing anything       HISTORY OF PRES Substance and Sexual Activity      Alcohol use: Not Currently        Comment: socially      Drug use: No    Other Topics      Concerns:        Pt has a pacemaker: No        Pt has a defibrillator: No        Reaction to local anesthetic: No      Family His Right: tiny attic retraction pocket left: Normal.   Skin Normal Inspection - Normal.        Lymph Detail Normal Submental. Submandibular. Anterior cervical. Posterior cervical. Supraclavicular.    Laryngoscopy   normal true cords false cords epiglottis no t Current Outpatient Medications:   •  Omeprazole 40 MG Oral Capsule Delayed Release, Take 1 capsule (40 mg total) by mouth daily.  Before a meal, Disp: 30 capsule, Rfl: 2  •  Multiple Vitamins-Minerals (MENS MULTI VITAMIN & MINERAL) Oral Tab, Take 1 tabl

## 2021-09-27 ENCOUNTER — OFFICE VISIT (OUTPATIENT)
Dept: OTOLARYNGOLOGY | Facility: CLINIC | Age: 36
End: 2021-09-27
Payer: COMMERCIAL

## 2021-09-27 VITALS — BODY MASS INDEX: 25.84 KG/M2 | WEIGHT: 195 LBS | HEIGHT: 73 IN

## 2021-09-27 DIAGNOSIS — K21.9 GASTROESOPHAGEAL REFLUX DISEASE, UNSPECIFIED WHETHER ESOPHAGITIS PRESENT: Primary | ICD-10-CM

## 2021-09-27 DIAGNOSIS — H73.891 RETRACTION POCKET OF TYMPANIC MEMBRANE, RIGHT: ICD-10-CM

## 2021-09-27 PROCEDURE — 99213 OFFICE O/P EST LOW 20 MIN: CPT | Performed by: OTOLARYNGOLOGY

## 2021-09-27 PROCEDURE — 3008F BODY MASS INDEX DOCD: CPT | Performed by: OTOLARYNGOLOGY

## 2021-09-27 NOTE — PROGRESS NOTES
Marisela Anderson is a 39year old male. Patient presents with: Follow - Up: pt is here today for a 2 month follow up, He is taking omeprazole and he is doing good.  He is feeling alot better      HISTORY OF PRESENT ILLNESS  7/21/2021  Patient presents f any point time that he needed a secondary scope. Wonders if a lot of his symptoms are anxiety related also.   Social History    Socioeconomic History      Marital status:     Tobacco Use      Smoking status: Never Smoker      Smokeless tobacco: [facility] Left: Normal.     Neurological Normal Memory - Normal. Cranial nerves - Cranial nerves II through XII grossly intact.    Head/Face Normal Facial features - Normal. Eyebrows - Normal. Skull - Normal.             Ears abNormal Inspection - Right: Normal, Left

## 2021-10-13 RX ORDER — OMEPRAZOLE 40 MG/1
CAPSULE, DELAYED RELEASE ORAL
Qty: 30 CAPSULE | Refills: 0 | OUTPATIENT
Start: 2021-10-13

## 2021-10-13 NOTE — TELEPHONE ENCOUNTER
Called patient as LOV note states for one month of medication and \"then try stopping it\". Patient is asking if he should \"stop cold turkey or wean\".  Please advise Dr. Nuvia Khan

## 2021-10-13 NOTE — TELEPHONE ENCOUNTER
Called patient. No need to wean medication, will stop once current bottle is empty and follow up with us regarding how he feels.

## 2022-04-07 ENCOUNTER — OFFICE VISIT (OUTPATIENT)
Dept: INTERNAL MEDICINE CLINIC | Facility: CLINIC | Age: 37
End: 2022-04-07
Payer: COMMERCIAL

## 2022-04-07 DIAGNOSIS — Q63.2 MALROTATION OF KIDNEY: ICD-10-CM

## 2022-04-07 DIAGNOSIS — Z00.00 ANNUAL PHYSICAL EXAM: Primary | ICD-10-CM

## 2022-04-07 DIAGNOSIS — H11.31 BURST BLOOD VESSEL OF RIGHT EYE: ICD-10-CM

## 2022-04-07 DIAGNOSIS — E78.5 HYPERLIPIDEMIA, UNSPECIFIED HYPERLIPIDEMIA TYPE: ICD-10-CM

## 2022-04-07 LAB
ALBUMIN SERPL-MCNC: 4.2 G/DL (ref 3.4–5)
ALBUMIN/GLOB SERPL: 1.3 {RATIO} (ref 1–2)
ALP LIVER SERPL-CCNC: 58 U/L
ALT SERPL-CCNC: 87 U/L
ANION GAP SERPL CALC-SCNC: 5 MMOL/L (ref 0–18)
AST SERPL-CCNC: 41 U/L (ref 15–37)
BASOPHILS # BLD AUTO: 0.03 X10(3) UL (ref 0–0.2)
BASOPHILS NFR BLD AUTO: 0.4 %
BILIRUB SERPL-MCNC: 0.6 MG/DL (ref 0.1–2)
BILIRUB UR QL: NEGATIVE
BUN BLD-MCNC: 12 MG/DL (ref 7–18)
BUN/CREAT SERPL: 9.7 (ref 10–20)
CALCIUM BLD-MCNC: 9.7 MG/DL (ref 8.5–10.1)
CHLORIDE SERPL-SCNC: 105 MMOL/L (ref 98–112)
CHOLEST SERPL-MCNC: 265 MG/DL (ref ?–200)
CLARITY UR: CLEAR
CO2 SERPL-SCNC: 31 MMOL/L (ref 21–32)
COLOR UR: YELLOW
CREAT BLD-MCNC: 1.24 MG/DL
DEPRECATED RDW RBC AUTO: 39.7 FL (ref 35.1–46.3)
EOSINOPHIL # BLD AUTO: 0.04 X10(3) UL (ref 0–0.7)
EOSINOPHIL NFR BLD AUTO: 0.6 %
ERYTHROCYTE [DISTWIDTH] IN BLOOD BY AUTOMATED COUNT: 12.8 % (ref 11–15)
FASTING PATIENT LIPID ANSWER: YES
FASTING STATUS PATIENT QL REPORTED: YES
GLOBULIN PLAS-MCNC: 3.3 G/DL (ref 2.8–4.4)
GLUCOSE BLD-MCNC: 104 MG/DL (ref 70–99)
GLUCOSE UR-MCNC: NEGATIVE MG/DL
HCT VFR BLD AUTO: 49.4 %
HDLC SERPL-MCNC: 45 MG/DL (ref 40–59)
HGB BLD-MCNC: 16.3 G/DL
HGB UR QL STRIP.AUTO: NEGATIVE
IMM GRANULOCYTES # BLD AUTO: 0.01 X10(3) UL (ref 0–1)
IMM GRANULOCYTES NFR BLD: 0.1 %
LDLC SERPL CALC-MCNC: 189 MG/DL (ref ?–100)
LEUKOCYTE ESTERASE UR QL STRIP.AUTO: NEGATIVE
LYMPHOCYTES # BLD AUTO: 1.13 X10(3) UL (ref 1–4)
LYMPHOCYTES NFR BLD AUTO: 15.7 %
MCH RBC QN AUTO: 27.8 PG (ref 26–34)
MCHC RBC AUTO-ENTMCNC: 33 G/DL (ref 31–37)
MCV RBC AUTO: 84.3 FL
MONOCYTES # BLD AUTO: 0.45 X10(3) UL (ref 0.1–1)
MONOCYTES NFR BLD AUTO: 6.2 %
NEUTROPHILS # BLD AUTO: 5.56 X10 (3) UL (ref 1.5–7.7)
NEUTROPHILS # BLD AUTO: 5.56 X10(3) UL (ref 1.5–7.7)
NEUTROPHILS NFR BLD AUTO: 77 %
NITRITE UR QL STRIP.AUTO: NEGATIVE
NONHDLC SERPL-MCNC: 220 MG/DL (ref ?–130)
OSMOLALITY SERPL CALC.SUM OF ELEC: 292 MOSM/KG (ref 275–295)
PH UR: 6 [PH] (ref 5–8)
PLATELET # BLD AUTO: 259 10(3)UL (ref 150–450)
POTASSIUM SERPL-SCNC: 3.8 MMOL/L (ref 3.5–5.1)
PROT SERPL-MCNC: 7.5 G/DL (ref 6.4–8.2)
PROT UR-MCNC: NEGATIVE MG/DL
RBC # BLD AUTO: 5.86 X10(6)UL
SODIUM SERPL-SCNC: 141 MMOL/L (ref 136–145)
SP GR UR STRIP: 1.02 (ref 1–1.03)
TRIGL SERPL-MCNC: 165 MG/DL (ref 30–149)
TSI SER-ACNC: 0.87 MIU/ML (ref 0.36–3.74)
UROBILINOGEN UR STRIP-ACNC: <2
VIT C UR-MCNC: 40 MG/DL
VLDLC SERPL CALC-MCNC: 35 MG/DL (ref 0–30)
WBC # BLD AUTO: 7.2 X10(3) UL (ref 4–11)

## 2022-04-07 PROCEDURE — 81001 URINALYSIS AUTO W/SCOPE: CPT | Performed by: INTERNAL MEDICINE

## 2022-04-07 PROCEDURE — 84443 ASSAY THYROID STIM HORMONE: CPT | Performed by: INTERNAL MEDICINE

## 2022-04-07 PROCEDURE — 85025 COMPLETE CBC W/AUTO DIFF WBC: CPT | Performed by: INTERNAL MEDICINE

## 2022-04-07 PROCEDURE — 80061 LIPID PANEL: CPT | Performed by: INTERNAL MEDICINE

## 2022-04-07 PROCEDURE — 80053 COMPREHEN METABOLIC PANEL: CPT | Performed by: INTERNAL MEDICINE

## 2022-04-08 ENCOUNTER — OFFICE VISIT (OUTPATIENT)
Dept: OTOLARYNGOLOGY | Facility: CLINIC | Age: 37
End: 2022-04-08
Payer: COMMERCIAL

## 2022-04-08 DIAGNOSIS — K21.9 GASTROESOPHAGEAL REFLUX DISEASE, UNSPECIFIED WHETHER ESOPHAGITIS PRESENT: Primary | ICD-10-CM

## 2022-04-08 PROCEDURE — 99213 OFFICE O/P EST LOW 20 MIN: CPT | Performed by: OTOLARYNGOLOGY

## 2022-04-08 RX ORDER — OMEPRAZOLE 40 MG/1
40 CAPSULE, DELAYED RELEASE ORAL DAILY
Qty: 90 CAPSULE | Refills: 4 | Status: SHIPPED | OUTPATIENT
Start: 2022-04-08

## 2022-04-09 VITALS
HEIGHT: 73 IN | TEMPERATURE: 97 F | RESPIRATION RATE: 17 BRPM | WEIGHT: 177 LBS | SYSTOLIC BLOOD PRESSURE: 132 MMHG | BODY MASS INDEX: 23.46 KG/M2 | DIASTOLIC BLOOD PRESSURE: 82 MMHG | HEART RATE: 76 BPM | OXYGEN SATURATION: 98 %

## 2022-04-14 ENCOUNTER — LAB ENCOUNTER (OUTPATIENT)
Dept: LAB | Facility: HOSPITAL | Age: 37
End: 2022-04-14
Attending: INTERNAL MEDICINE
Payer: COMMERCIAL

## 2022-04-14 ENCOUNTER — OFFICE VISIT (OUTPATIENT)
Dept: HEMATOLOGY/ONCOLOGY | Facility: HOSPITAL | Age: 37
End: 2022-04-14
Attending: STUDENT IN AN ORGANIZED HEALTH CARE EDUCATION/TRAINING PROGRAM
Payer: COMMERCIAL

## 2022-04-14 VITALS
HEIGHT: 73 IN | OXYGEN SATURATION: 98 % | TEMPERATURE: 99 F | RESPIRATION RATE: 16 BRPM | DIASTOLIC BLOOD PRESSURE: 77 MMHG | SYSTOLIC BLOOD PRESSURE: 142 MMHG | HEART RATE: 100 BPM | BODY MASS INDEX: 22.93 KG/M2 | WEIGHT: 173 LBS

## 2022-04-14 DIAGNOSIS — R79.0 LOW FERRITIN: ICD-10-CM

## 2022-04-14 DIAGNOSIS — R79.89 ELEVATED LFTS: ICD-10-CM

## 2022-04-14 DIAGNOSIS — D50.9 IRON DEFICIENCY ANEMIA, UNSPECIFIED IRON DEFICIENCY ANEMIA TYPE: ICD-10-CM

## 2022-04-14 DIAGNOSIS — D75.1 POLYCYTHEMIA: ICD-10-CM

## 2022-04-14 DIAGNOSIS — D75.1 POLYCYTHEMIA: Primary | ICD-10-CM

## 2022-04-14 LAB
BASOPHILS # BLD AUTO: 0.02 X10(3) UL (ref 0–0.2)
BASOPHILS NFR BLD AUTO: 0.2 %
DEPRECATED HBV CORE AB SER IA-ACNC: 19.2 NG/ML
DEPRECATED RDW RBC AUTO: 38.4 FL (ref 35.1–46.3)
EOSINOPHIL # BLD AUTO: 0.01 X10(3) UL (ref 0–0.7)
EOSINOPHIL NFR BLD AUTO: 0.1 %
ERYTHROCYTE [DISTWIDTH] IN BLOOD BY AUTOMATED COUNT: 12.5 % (ref 11–15)
HCT VFR BLD AUTO: 48.9 %
HGB BLD-MCNC: 16.1 G/DL
IMM GRANULOCYTES # BLD AUTO: 0.03 X10(3) UL (ref 0–1)
IMM GRANULOCYTES NFR BLD: 0.3 %
IRON SATN MFR SERPL: 16 %
IRON SERPL-MCNC: 83 UG/DL
LYMPHOCYTES # BLD AUTO: 0.76 X10(3) UL (ref 1–4)
LYMPHOCYTES NFR BLD AUTO: 8.4 %
MCH RBC QN AUTO: 27.8 PG (ref 26–34)
MCHC RBC AUTO-ENTMCNC: 32.9 G/DL (ref 31–37)
MCV RBC AUTO: 84.3 FL
MONOCYTES # BLD AUTO: 0.5 X10(3) UL (ref 0.1–1)
MONOCYTES NFR BLD AUTO: 5.5 %
NEUTROPHILS # BLD AUTO: 7.74 X10 (3) UL (ref 1.5–7.7)
NEUTROPHILS # BLD AUTO: 7.74 X10(3) UL (ref 1.5–7.7)
NEUTROPHILS NFR BLD AUTO: 85.5 %
PLATELET # BLD AUTO: 270 10(3)UL (ref 150–450)
RBC # BLD AUTO: 5.8 X10(6)UL
TIBC SERPL-MCNC: 519 UG/DL (ref 240–450)
TRANSFERRIN SERPL-MCNC: 348 MG/DL (ref 200–360)
TRANSFERRIN SERPL-MCNC: 348 MG/DL (ref 200–360)
WBC # BLD AUTO: 9.1 X10(3) UL (ref 4–11)

## 2022-04-14 PROCEDURE — 86038 ANTINUCLEAR ANTIBODIES: CPT

## 2022-04-14 PROCEDURE — 82668 ASSAY OF ERYTHROPOIETIN: CPT

## 2022-04-14 PROCEDURE — 86381 MITOCHONDRIAL ANTIBODY EACH: CPT

## 2022-04-14 PROCEDURE — 86256 FLUORESCENT ANTIBODY TITER: CPT

## 2022-04-14 PROCEDURE — 85060 BLOOD SMEAR INTERPRETATION: CPT

## 2022-04-14 PROCEDURE — 84466 ASSAY OF TRANSFERRIN: CPT

## 2022-04-14 PROCEDURE — 82728 ASSAY OF FERRITIN: CPT

## 2022-04-14 PROCEDURE — 36415 COLL VENOUS BLD VENIPUNCTURE: CPT

## 2022-04-14 PROCEDURE — 81270 JAK2 GENE: CPT

## 2022-04-14 PROCEDURE — 83540 ASSAY OF IRON: CPT

## 2022-04-14 PROCEDURE — 99211 OFF/OP EST MAY X REQ PHY/QHP: CPT

## 2022-04-14 PROCEDURE — 85025 COMPLETE CBC W/AUTO DIFF WBC: CPT

## 2022-04-14 RX ORDER — MULTIVIT WITH MINERALS/LUTEIN
1000 TABLET ORAL DAILY
COMMUNITY

## 2022-04-14 RX ORDER — MELATONIN
1000 DAILY
COMMUNITY

## 2022-04-15 LAB
MITOCHONDRIA AB TITR SER: <20 {TITER}
SMOOTH MUSCLE AB TITR SER: <20 {TITER}

## 2022-04-16 LAB — ERYTHROPOIETIN (EPO): 8 MU/ML

## 2022-04-17 NOTE — ED INITIAL ASSESSMENT (HPI)
Pt c/o dizziness, discomfort in legs; reports recent result of low ferritin. sts he is pending other labs as well.

## 2022-04-18 LAB — NUCLEAR IGG TITR SER IF: NEGATIVE {TITER}

## 2022-04-18 NOTE — ED QUICK NOTES
Dr Erika Knight and myself discussed anxiety/kavitha attack manifestations, assessed patient physically and spent time reassuring him and his wife that all tests being performed outside of the ER are going to be the best and most efficient workup. Pt is breathing unlabored, abdomen soft and nontender. No tingling/numbness. Ambulatory with stead gait. Dr Concepcion Perez discussed [facility] results with patient and explained the reason for administering Ativan. Pt verbalized understanding    Wife endorsed that patient has been under stress at work, baby in the room with wife and patient and endorsed patient has difficulty sleeping, eating and paces often with increasing anxiety from pending results. Pt did not report and SI or HI statements and is stable at this time.   Report to [facility]

## 2022-04-26 ENCOUNTER — TELEPHONE (OUTPATIENT)
Dept: HEMATOLOGY/ONCOLOGY | Facility: HOSPITAL | Age: 37
End: 2022-04-26

## 2022-04-26 NOTE — TELEPHONE ENCOUNTER
Writer called patient to inform him that one of his labs has not resulted, which would be preferred for MD follow up. Patient verbalized understanding and appointment was rescheduled for Tuesday May 10th.

## 2022-04-27 ENCOUNTER — APPOINTMENT (OUTPATIENT)
Dept: HEMATOLOGY/ONCOLOGY | Facility: HOSPITAL | Age: 37
End: 2022-04-27
Attending: STUDENT IN AN ORGANIZED HEALTH CARE EDUCATION/TRAINING PROGRAM
Payer: COMMERCIAL

## 2022-04-27 LAB — JAK2 GENE, V617F MUTATION, QUALITATIVE: NOT DETECTED

## 2022-05-01 LAB — JAK2 EXON 12 MUTATION ANAL PCR: NOT DETECTED

## 2022-05-10 ENCOUNTER — OFFICE VISIT (OUTPATIENT)
Dept: HEMATOLOGY/ONCOLOGY | Facility: HOSPITAL | Age: 37
End: 2022-05-10
Attending: STUDENT IN AN ORGANIZED HEALTH CARE EDUCATION/TRAINING PROGRAM
Payer: COMMERCIAL

## 2022-05-10 ENCOUNTER — HOSPITAL ENCOUNTER (OUTPATIENT)
Dept: ULTRASOUND IMAGING | Age: 37
Discharge: HOME | End: 2022-05-10
Attending: INTERNAL MEDICINE
Payer: COMMERCIAL

## 2022-05-10 VITALS
SYSTOLIC BLOOD PRESSURE: 137 MMHG | HEART RATE: 85 BPM | WEIGHT: 172 LBS | RESPIRATION RATE: 16 BRPM | BODY MASS INDEX: 22.8 KG/M2 | OXYGEN SATURATION: 98 % | DIASTOLIC BLOOD PRESSURE: 82 MMHG | HEIGHT: 73 IN | TEMPERATURE: 99 F

## 2022-05-10 DIAGNOSIS — D50.9 IRON DEFICIENCY ANEMIA, UNSPECIFIED IRON DEFICIENCY ANEMIA TYPE: Primary | ICD-10-CM

## 2022-05-10 DIAGNOSIS — R79.89 ELEVATED LFTS: ICD-10-CM

## 2022-05-10 DIAGNOSIS — D75.1 POLYCYTHEMIA: ICD-10-CM

## 2022-05-10 PROCEDURE — 99213 OFFICE O/P EST LOW 20 MIN: CPT | Performed by: STUDENT IN AN ORGANIZED HEALTH CARE EDUCATION/TRAINING PROGRAM

## 2022-05-10 PROCEDURE — 76705 ECHO EXAM OF ABDOMEN: CPT | Performed by: INTERNAL MEDICINE

## 2022-05-17 ENCOUNTER — OFFICE VISIT (OUTPATIENT)
Dept: INTERNAL MEDICINE CLINIC | Facility: CLINIC | Age: 37
End: 2022-05-17
Payer: COMMERCIAL

## 2022-05-17 VITALS
HEART RATE: 78 BPM | TEMPERATURE: 98 F | WEIGHT: 174 LBS | BODY MASS INDEX: 23.06 KG/M2 | HEIGHT: 73 IN | SYSTOLIC BLOOD PRESSURE: 120 MMHG | DIASTOLIC BLOOD PRESSURE: 66 MMHG | OXYGEN SATURATION: 98 % | RESPIRATION RATE: 17 BRPM

## 2022-05-17 DIAGNOSIS — R06.83 SNORING: ICD-10-CM

## 2022-05-17 DIAGNOSIS — G47.30 SLEEP DISORDER BREATHING: ICD-10-CM

## 2022-05-17 DIAGNOSIS — K44.9 HIATAL HERNIA: ICD-10-CM

## 2022-05-17 DIAGNOSIS — K21.9 GASTROESOPHAGEAL REFLUX DISEASE, UNSPECIFIED WHETHER ESOPHAGITIS PRESENT: Primary | ICD-10-CM

## 2022-05-17 DIAGNOSIS — R53.83 FATIGUE, UNSPECIFIED TYPE: ICD-10-CM

## 2022-05-17 DIAGNOSIS — R79.89 ELEVATED LFTS: ICD-10-CM

## 2022-05-17 DIAGNOSIS — F41.1 GENERALIZED ANXIETY DISORDER: ICD-10-CM

## 2022-05-17 PROCEDURE — 3074F SYST BP LT 130 MM HG: CPT | Performed by: INTERNAL MEDICINE

## 2022-05-17 PROCEDURE — 99214 OFFICE O/P EST MOD 30 MIN: CPT | Performed by: INTERNAL MEDICINE

## 2022-05-17 PROCEDURE — 3008F BODY MASS INDEX DOCD: CPT | Performed by: INTERNAL MEDICINE

## 2022-05-17 PROCEDURE — 3078F DIAST BP <80 MM HG: CPT | Performed by: INTERNAL MEDICINE

## 2022-05-17 RX ORDER — MELATONIN
325
COMMUNITY

## 2022-06-01 ENCOUNTER — OFFICE VISIT (OUTPATIENT)
Dept: SURGERY | Facility: CLINIC | Age: 37
End: 2022-06-01
Payer: COMMERCIAL

## 2022-06-01 DIAGNOSIS — Q63.2 MALROTATION OF KIDNEY: Primary | ICD-10-CM

## 2022-06-01 PROCEDURE — 99213 OFFICE O/P EST LOW 20 MIN: CPT | Performed by: UROLOGY

## 2022-06-29 ENCOUNTER — HOSPITAL ENCOUNTER (OUTPATIENT)
Dept: CT IMAGING | Age: 37
Discharge: HOME | End: 2022-06-29
Attending: INTERNAL MEDICINE

## 2022-06-29 VITALS — WEIGHT: 174 LBS | BODY MASS INDEX: 23.06 KG/M2 | HEIGHT: 73 IN

## 2022-06-29 DIAGNOSIS — Q63.2 MALROTATION OF KIDNEY: ICD-10-CM

## 2022-06-29 DIAGNOSIS — E78.5 HYPERLIPIDEMIA, UNSPECIFIED HYPERLIPIDEMIA TYPE: ICD-10-CM

## 2022-06-29 DIAGNOSIS — Z00.00 ANNUAL PHYSICAL EXAM: ICD-10-CM

## 2022-06-29 DIAGNOSIS — H11.31 BURST BLOOD VESSEL OF RIGHT EYE: ICD-10-CM

## 2022-07-06 ENCOUNTER — ORDER TRANSCRIPTION (OUTPATIENT)
Dept: SLEEP CENTER | Age: 37
End: 2022-07-06

## 2022-07-06 DIAGNOSIS — R53.83 FATIGUE: ICD-10-CM

## 2022-07-06 DIAGNOSIS — G47.30 INSOMNIA WITH SLEEP APNEA: Primary | ICD-10-CM

## 2022-07-06 DIAGNOSIS — G47.00 INSOMNIA WITH SLEEP APNEA: Primary | ICD-10-CM

## 2022-07-06 DIAGNOSIS — R06.83 SNORING: ICD-10-CM

## 2022-07-08 ENCOUNTER — LAB ENCOUNTER (OUTPATIENT)
Dept: LAB | Facility: HOSPITAL | Age: 37
End: 2022-07-08
Attending: INTERNAL MEDICINE
Payer: COMMERCIAL

## 2022-07-08 DIAGNOSIS — N62 GYNECOMASTIA: ICD-10-CM

## 2022-07-08 LAB
DHEA-S SERPL-MCNC: 263.1 UG/DL
ESTRADIOL SERPL-MCNC: 27.7 PG/ML
LH SERPL-ACNC: 1.2 MIU/ML

## 2022-07-08 PROCEDURE — 84403 ASSAY OF TOTAL TESTOSTERONE: CPT

## 2022-07-08 PROCEDURE — 36415 COLL VENOUS BLD VENIPUNCTURE: CPT

## 2022-07-08 PROCEDURE — 82627 DEHYDROEPIANDROSTERONE: CPT

## 2022-07-08 PROCEDURE — 82670 ASSAY OF TOTAL ESTRADIOL: CPT

## 2022-07-08 PROCEDURE — 83002 ASSAY OF GONADOTROPIN (LH): CPT

## 2022-07-08 PROCEDURE — 84402 ASSAY OF FREE TESTOSTERONE: CPT

## 2022-07-26 ENCOUNTER — OFFICE VISIT (OUTPATIENT)
Dept: INTERNAL MEDICINE CLINIC | Facility: CLINIC | Age: 37
End: 2022-07-26
Payer: COMMERCIAL

## 2022-07-26 VITALS
BODY MASS INDEX: 22.53 KG/M2 | OXYGEN SATURATION: 98 % | HEIGHT: 73 IN | SYSTOLIC BLOOD PRESSURE: 132 MMHG | HEART RATE: 76 BPM | DIASTOLIC BLOOD PRESSURE: 76 MMHG | TEMPERATURE: 98 F | WEIGHT: 170 LBS | RESPIRATION RATE: 18 BRPM

## 2022-07-26 DIAGNOSIS — U07.1 COVID-19 VIRUS INFECTION: Primary | ICD-10-CM

## 2022-07-26 PROCEDURE — 3075F SYST BP GE 130 - 139MM HG: CPT | Performed by: INTERNAL MEDICINE

## 2022-07-26 PROCEDURE — 99213 OFFICE O/P EST LOW 20 MIN: CPT | Performed by: INTERNAL MEDICINE

## 2022-07-26 PROCEDURE — 3008F BODY MASS INDEX DOCD: CPT | Performed by: INTERNAL MEDICINE

## 2022-07-26 PROCEDURE — 3078F DIAST BP <80 MM HG: CPT | Performed by: INTERNAL MEDICINE

## 2022-07-29 LAB
TESTOSTERONE, FREE, S: 10.4 NG/DL
TESTOSTERONE, TOTAL, S: 481 NG/DL

## 2022-08-17 ENCOUNTER — OFFICE VISIT (OUTPATIENT)
Dept: GASTROENTEROLOGY | Facility: CLINIC | Age: 37
End: 2022-08-17
Payer: COMMERCIAL

## 2022-08-17 VITALS
HEART RATE: 120 BPM | WEIGHT: 187 LBS | DIASTOLIC BLOOD PRESSURE: 89 MMHG | SYSTOLIC BLOOD PRESSURE: 135 MMHG | HEIGHT: 73 IN | BODY MASS INDEX: 24.78 KG/M2

## 2022-08-17 DIAGNOSIS — K21.9 GASTROESOPHAGEAL REFLUX DISEASE WITHOUT ESOPHAGITIS: Primary | ICD-10-CM

## 2022-08-17 DIAGNOSIS — R79.89 ABNORMAL LIVER FUNCTION TEST: ICD-10-CM

## 2022-08-17 DIAGNOSIS — R79.0 LOW FERRITIN: ICD-10-CM

## 2022-08-17 PROCEDURE — 3008F BODY MASS INDEX DOCD: CPT | Performed by: INTERNAL MEDICINE

## 2022-08-17 PROCEDURE — 99204 OFFICE O/P NEW MOD 45 MIN: CPT | Performed by: INTERNAL MEDICINE

## 2022-08-17 PROCEDURE — 3075F SYST BP GE 130 - 139MM HG: CPT | Performed by: INTERNAL MEDICINE

## 2022-08-17 PROCEDURE — 3079F DIAST BP 80-89 MM HG: CPT | Performed by: INTERNAL MEDICINE

## 2022-08-18 NOTE — PATIENT INSTRUCTIONS
1.  May decrease omeprazole to 3 times weekly. 2.  Repeat blood work (both my orders and Dr. Arnaud Kendall orders) in September. 3.  Further recommendations pending these results.

## 2022-09-09 ENCOUNTER — NURSE ONLY (OUTPATIENT)
Dept: HEMATOLOGY/ONCOLOGY | Facility: HOSPITAL | Age: 37
End: 2022-09-09
Attending: STUDENT IN AN ORGANIZED HEALTH CARE EDUCATION/TRAINING PROGRAM
Payer: COMMERCIAL

## 2022-09-09 ENCOUNTER — LAB ENCOUNTER (OUTPATIENT)
Dept: LAB | Facility: HOSPITAL | Age: 37
End: 2022-09-09
Attending: INTERNAL MEDICINE
Payer: COMMERCIAL

## 2022-09-09 VITALS
HEART RATE: 73 BPM | RESPIRATION RATE: 16 BRPM | WEIGHT: 193 LBS | SYSTOLIC BLOOD PRESSURE: 131 MMHG | OXYGEN SATURATION: 99 % | HEIGHT: 73 IN | BODY MASS INDEX: 25.58 KG/M2 | DIASTOLIC BLOOD PRESSURE: 90 MMHG | TEMPERATURE: 99 F

## 2022-09-09 DIAGNOSIS — E61.1 IRON DEFICIENCY: ICD-10-CM

## 2022-09-09 DIAGNOSIS — D75.1 POLYCYTHEMIA: Primary | ICD-10-CM

## 2022-09-09 DIAGNOSIS — R79.89 ABNORMAL LIVER FUNCTION TEST: ICD-10-CM

## 2022-09-09 DIAGNOSIS — D50.9 IRON DEFICIENCY ANEMIA, UNSPECIFIED IRON DEFICIENCY ANEMIA TYPE: ICD-10-CM

## 2022-09-09 DIAGNOSIS — R79.0 LOW FERRITIN: ICD-10-CM

## 2022-09-09 LAB
ALBUMIN SERPL-MCNC: 3.8 G/DL (ref 3.4–5)
ALP LIVER SERPL-CCNC: 73 U/L
ALT SERPL-CCNC: 76 U/L
AST SERPL-CCNC: 48 U/L (ref 15–37)
BASOPHILS # BLD AUTO: 0.03 X10(3) UL (ref 0–0.2)
BASOPHILS NFR BLD AUTO: 0.5 %
BILIRUB DIRECT SERPL-MCNC: <0.1 MG/DL (ref 0–0.2)
BILIRUB SERPL-MCNC: 0.4 MG/DL (ref 0.1–2)
DEPRECATED HBV CORE AB SER IA-ACNC: 9.5 NG/ML
DEPRECATED RDW RBC AUTO: 43 FL (ref 35.1–46.3)
EOSINOPHIL # BLD AUTO: 0.24 X10(3) UL (ref 0–0.7)
EOSINOPHIL NFR BLD AUTO: 3.9 %
ERYTHROCYTE [DISTWIDTH] IN BLOOD BY AUTOMATED COUNT: 14.1 % (ref 11–15)
HAV AB SER QL IA: NONREACTIVE
HBV CORE AB SERPL QL IA: NONREACTIVE
HBV SURFACE AB SER QL: REACTIVE
HBV SURFACE AB SERPL IA-ACNC: >1000 MIU/ML
HBV SURFACE AG SERPL QL IA: NONREACTIVE
HCT VFR BLD AUTO: 48 %
HCV AB SERPL QL IA: NONREACTIVE
HGB BLD-MCNC: 15.6 G/DL
IGA SERPL-MCNC: 237 MG/DL (ref 70–312)
IMM GRANULOCYTES # BLD AUTO: 0.02 X10(3) UL (ref 0–1)
IMM GRANULOCYTES NFR BLD: 0.3 %
IRON SATN MFR SERPL: 14 %
IRON SERPL-MCNC: 65 UG/DL
LYMPHOCYTES # BLD AUTO: 1.51 X10(3) UL (ref 1–4)
LYMPHOCYTES NFR BLD AUTO: 24.5 %
MCH RBC QN AUTO: 26.9 PG (ref 26–34)
MCHC RBC AUTO-ENTMCNC: 32.5 G/DL (ref 31–37)
MCV RBC AUTO: 82.8 FL
MONOCYTES # BLD AUTO: 0.57 X10(3) UL (ref 0.1–1)
MONOCYTES NFR BLD AUTO: 9.3 %
NEUTROPHILS # BLD AUTO: 3.79 X10 (3) UL (ref 1.5–7.7)
NEUTROPHILS # BLD AUTO: 3.79 X10(3) UL (ref 1.5–7.7)
NEUTROPHILS NFR BLD AUTO: 61.5 %
PLATELET # BLD AUTO: 240 10(3)UL (ref 150–450)
PROT SERPL-MCNC: 7.2 G/DL (ref 6.4–8.2)
RBC # BLD AUTO: 5.8 X10(6)UL
TIBC SERPL-MCNC: 475 UG/DL (ref 240–450)
TRANSFERRIN SERPL-MCNC: 319 MG/DL (ref 200–360)
TTG IGA SER-ACNC: 0.5 U/ML (ref ?–7)
WBC # BLD AUTO: 6.2 X10(3) UL (ref 4–11)

## 2022-09-09 PROCEDURE — 82390 ASSAY OF CERULOPLASMIN: CPT

## 2022-09-09 PROCEDURE — 82784 ASSAY IGA/IGD/IGG/IGM EACH: CPT

## 2022-09-09 PROCEDURE — 80503 PATH CLIN CONSLTJ SF 5-20: CPT

## 2022-09-09 PROCEDURE — 85025 COMPLETE CBC W/AUTO DIFF WBC: CPT

## 2022-09-09 PROCEDURE — 82103 ALPHA-1-ANTITRYPSIN TOTAL: CPT

## 2022-09-09 PROCEDURE — 36415 COLL VENOUS BLD VENIPUNCTURE: CPT

## 2022-09-09 PROCEDURE — 99213 OFFICE O/P EST LOW 20 MIN: CPT | Performed by: STUDENT IN AN ORGANIZED HEALTH CARE EDUCATION/TRAINING PROGRAM

## 2022-09-09 PROCEDURE — 86803 HEPATITIS C AB TEST: CPT

## 2022-09-09 PROCEDURE — 82728 ASSAY OF FERRITIN: CPT

## 2022-09-09 PROCEDURE — 86706 HEP B SURFACE ANTIBODY: CPT

## 2022-09-09 PROCEDURE — 87340 HEPATITIS B SURFACE AG IA: CPT

## 2022-09-09 PROCEDURE — 80076 HEPATIC FUNCTION PANEL: CPT

## 2022-09-09 PROCEDURE — 86364 TISS TRNSGLTMNASE EA IG CLAS: CPT

## 2022-09-09 PROCEDURE — 86704 HEP B CORE ANTIBODY TOTAL: CPT

## 2022-09-09 PROCEDURE — 86708 HEPATITIS A ANTIBODY: CPT

## 2022-09-09 PROCEDURE — 84466 ASSAY OF TRANSFERRIN: CPT

## 2022-09-09 PROCEDURE — 83540 ASSAY OF IRON: CPT

## 2022-09-10 LAB
A1AT SERPL-MCNC: 114 MG/DL (ref 90–200)
CERULOPLASMIN SERPL-MCNC: 20.6 MG/DL (ref 20–60)

## 2022-09-20 ENCOUNTER — PATIENT MESSAGE (OUTPATIENT)
Dept: GASTROENTEROLOGY | Facility: CLINIC | Age: 37
End: 2022-09-20

## 2022-09-21 ENCOUNTER — HOSPITAL ENCOUNTER (OUTPATIENT)
Dept: ULTRASOUND IMAGING | Age: 37
Discharge: HOME | End: 2022-09-21
Attending: UROLOGY

## 2022-09-21 ENCOUNTER — PATIENT MESSAGE (OUTPATIENT)
Dept: INTERNAL MEDICINE CLINIC | Facility: CLINIC | Age: 37
End: 2022-09-21

## 2022-09-21 DIAGNOSIS — Q63.2 MALROTATION OF KIDNEY: ICD-10-CM

## 2022-09-21 DIAGNOSIS — R41.3 MEMORY PROBLEM: Primary | ICD-10-CM

## 2022-09-21 DIAGNOSIS — R41.89 BRAIN FOG: ICD-10-CM

## 2022-09-21 DIAGNOSIS — R25.1 OCCASIONAL TREMORS: ICD-10-CM

## 2022-09-21 PROCEDURE — 76775 US EXAM ABDO BACK WALL LIM: CPT | Performed by: UROLOGY

## 2022-09-21 NOTE — TELEPHONE ENCOUNTER
From: Shirley Carrera  To: Bryson Panda MD  Sent: 9/20/2022 7:04 PM CDT  Subject: Follow Up After Blane Finley,    I wanted to follow up regarding the labs I had done. If you can message me back or call [phone].      Thank you,    Barry Brown

## 2022-09-21 NOTE — TELEPHONE ENCOUNTER
Dr. Osiel Zamudio,    Patient requesting call or message with results from labs done 9/9/22, thank you.

## 2022-09-22 NOTE — TELEPHONE ENCOUNTER
I left a message on the patient's personal voicemail. His liver enzymes remain mildly elevated but stable. All additional testing to exclude other causes of liver disease including Colten's disease, alpha-1 antitrypsin deficiency and celiac disease were negative. The etiology of the elevated liver enzymes is unclear. I would not advocate a liver biopsy at this time based on the negative laboratory testing and only mild elevations with preserved synthetic function. The patient's ferritin has fallen further despite iron supplementation. This merits endoscopic work-up. I will contact the patient in the next few days to discuss.

## 2022-09-22 NOTE — TELEPHONE ENCOUNTER
I did send a message but again (not sure is going to respond or what i but yeah I can put referral for the usually all booked up like that but I will place a new referral

## 2022-09-23 ENCOUNTER — HOSPITAL ENCOUNTER (OUTPATIENT)
Dept: CT IMAGING | Facility: HOSPITAL | Age: 37
Discharge: HOME | End: 2022-09-23
Attending: INTERNAL MEDICINE

## 2022-09-23 ENCOUNTER — TELEPHONE (OUTPATIENT)
Dept: INTERNAL MEDICINE CLINIC | Facility: CLINIC | Age: 37
End: 2022-09-23

## 2022-09-23 DIAGNOSIS — R41.3 MEMORY PROBLEM: ICD-10-CM

## 2022-09-23 DIAGNOSIS — R25.1 OCCASIONAL TREMORS: ICD-10-CM

## 2022-09-23 DIAGNOSIS — R41.89 BRAIN FOG: ICD-10-CM

## 2022-09-23 PROCEDURE — 70450 CT HEAD/BRAIN W/O DYE: CPT | Performed by: INTERNAL MEDICINE

## 2022-09-23 NOTE — TELEPHONE ENCOUNTER
Hi, I will order ct head but not sure will give us much info, this is more of an anxiety than anything else and if symptoms get worst he needs to go to ER. I will order a sleep medicine and hopefully will help him with sleep, also to stop taking the trazodone and I will prescribe another SRI medication for anxiety Zoloft at 50 mg daily and alos Alprazolam 0.5 mg to take as need BId PRN. He need to also follow with psych, and again I will try to reach out to neuro .  Dr Leslie Varela   Any other questions let me know

## 2022-09-23 NOTE — TELEPHONE ENCOUNTER
Pt was called and informed of Dr. Alan Hardin message below and he verbalized understanding. Wife was also informed.

## 2022-09-23 NOTE — TELEPHONE ENCOUNTER
Hi, I will order ct head but not sure will give us much info, this is more of an anxiety than anything else and if symptoms get worst he needs to go to ER. I will order a sleep medicine and hopefully will help him with sleep, also to stop taking the trazodone and I will prescribe another SRI medication for anxiety Zoloft at 50 mg daily and alos Alprazolam 0.5 mg to take as need BId PRN. He need to also follow with psych, and again I will try to reach out to neuro .  Dr Hu Pleasure   Any other questions let me know

## 2022-09-24 ENCOUNTER — HOSPITAL ENCOUNTER (OUTPATIENT)
Dept: MRI IMAGING | Facility: HOSPITAL | Age: 37
Discharge: HOME | End: 2022-09-24
Attending: INTERNAL MEDICINE

## 2022-09-24 ENCOUNTER — TELEPHONE (OUTPATIENT)
Dept: INTERNAL MEDICINE CLINIC | Facility: CLINIC | Age: 37
End: 2022-09-24

## 2022-09-24 DIAGNOSIS — I65.29 CAROTID ARTERY CALCIFICATION, UNSPECIFIED LATERALITY: ICD-10-CM

## 2022-09-24 DIAGNOSIS — I67.2 CEREBRAL ARTERIOSCLEROSIS: ICD-10-CM

## 2022-09-24 LAB
CREAT BLD-MCNC: 1.2 MG/DL
GFR SERPLBLD BASED ON 1.73 SQ M-ARVRAT: 80 ML/MIN/1.73M2 (ref 60–?)

## 2022-09-24 PROCEDURE — 70549 MR ANGIOGRAPH NECK W/O&W/DYE: CPT | Performed by: INTERNAL MEDICINE

## 2022-09-24 PROCEDURE — 82565 ASSAY OF CREATININE: CPT

## 2022-09-24 PROCEDURE — 70544 MR ANGIOGRAPHY HEAD W/O DYE: CPT | Performed by: INTERNAL MEDICINE

## 2022-09-24 PROCEDURE — A9575 INJ GADOTERATE MEGLUMI 0.1ML: HCPCS | Performed by: INTERNAL MEDICINE

## 2022-09-24 NOTE — TELEPHONE ENCOUNTER
Yris Rodríguez from MRI wanted to know if Dr wanted to do contrast on MRI of carotid?  Pt has appt today at 10am? Please advise

## 2022-09-26 ENCOUNTER — PATIENT MESSAGE (OUTPATIENT)
Dept: PHYSICAL MEDICINE AND REHAB | Facility: CLINIC | Age: 37
End: 2022-09-26

## 2022-09-26 ENCOUNTER — OFFICE VISIT (OUTPATIENT)
Dept: NEUROLOGY | Facility: CLINIC | Age: 37
End: 2022-09-26

## 2022-09-26 ENCOUNTER — TELEPHONE (OUTPATIENT)
Dept: GASTROENTEROLOGY | Facility: CLINIC | Age: 37
End: 2022-09-26

## 2022-09-26 VITALS
SYSTOLIC BLOOD PRESSURE: 142 MMHG | WEIGHT: 186 LBS | HEART RATE: 86 BPM | DIASTOLIC BLOOD PRESSURE: 86 MMHG | BODY MASS INDEX: 24.65 KG/M2 | HEIGHT: 73 IN

## 2022-09-26 DIAGNOSIS — G47.00 INSOMNIA, UNSPECIFIED TYPE: Primary | ICD-10-CM

## 2022-09-26 DIAGNOSIS — R25.1 TREMOR: ICD-10-CM

## 2022-09-26 DIAGNOSIS — F41.9 ANXIETY: ICD-10-CM

## 2022-09-26 NOTE — TELEPHONE ENCOUNTER
Patient is calling to speak with a nurse regarding his labs. He insists that he needs to be seen asap for a follow up appointment.  Please call

## 2022-09-28 NOTE — TELEPHONE ENCOUNTER
Patient contacted and states he wants to keep his 09/29/2022 appointment with Dr. Ortiz Gallegos. Patient does not need Oct 3rd appointment.

## 2022-09-29 ENCOUNTER — HOSPITAL ENCOUNTER (OUTPATIENT)
Facility: HOSPITAL | Age: 37
Setting detail: OBSERVATION
Discharge: HOME | End: 2022-09-30
Attending: EMERGENCY MEDICINE | Admitting: INTERNAL MEDICINE
Payer: COMMERCIAL

## 2022-09-29 ENCOUNTER — TELEPHONE (OUTPATIENT)
Dept: NEUROLOGY | Facility: CLINIC | Age: 37
End: 2022-09-29

## 2022-09-29 ENCOUNTER — VIRTUAL PHONE E/M (OUTPATIENT)
Dept: INTERNAL MEDICINE CLINIC | Facility: CLINIC | Age: 37
End: 2022-09-29

## 2022-09-29 ENCOUNTER — OFFICE VISIT (OUTPATIENT)
Dept: GASTROENTEROLOGY | Facility: CLINIC | Age: 37
End: 2022-09-29
Payer: COMMERCIAL

## 2022-09-29 ENCOUNTER — PATIENT MESSAGE (OUTPATIENT)
Dept: PHYSICAL MEDICINE AND REHAB | Facility: CLINIC | Age: 37
End: 2022-09-29

## 2022-09-29 ENCOUNTER — TELEPHONE (OUTPATIENT)
Dept: INTERNAL MEDICINE CLINIC | Facility: CLINIC | Age: 37
End: 2022-09-29

## 2022-09-29 ENCOUNTER — TELEPHONE (OUTPATIENT)
Dept: GASTROENTEROLOGY | Facility: CLINIC | Age: 37
End: 2022-09-29

## 2022-09-29 VITALS
HEIGHT: 73 IN | RESPIRATION RATE: 18 BRPM | BODY MASS INDEX: 24.25 KG/M2 | WEIGHT: 183 LBS | SYSTOLIC BLOOD PRESSURE: 161 MMHG | HEART RATE: 114 BPM | DIASTOLIC BLOOD PRESSURE: 79 MMHG

## 2022-09-29 DIAGNOSIS — R00.2 PALPITATIONS: Primary | ICD-10-CM

## 2022-09-29 DIAGNOSIS — G47.00 INSOMNIA, UNSPECIFIED TYPE: Primary | ICD-10-CM

## 2022-09-29 DIAGNOSIS — E61.1 IRON DEFICIENCY: Primary | ICD-10-CM

## 2022-09-29 DIAGNOSIS — F41.9 ANXIETY: ICD-10-CM

## 2022-09-29 DIAGNOSIS — R25.1 TREMORS OF NERVOUS SYSTEM: ICD-10-CM

## 2022-09-29 DIAGNOSIS — R03.0 ELEVATED BLOOD PRESSURE READING: ICD-10-CM

## 2022-09-29 DIAGNOSIS — R00.2 PALPITATIONS: ICD-10-CM

## 2022-09-29 DIAGNOSIS — R25.1 TREMOR: ICD-10-CM

## 2022-09-29 DIAGNOSIS — D50.9 IRON DEFICIENCY ANEMIA, UNSPECIFIED IRON DEFICIENCY ANEMIA TYPE: Primary | ICD-10-CM

## 2022-09-29 DIAGNOSIS — G47.00 INSOMNIA, UNSPECIFIED TYPE: ICD-10-CM

## 2022-09-29 LAB
AMPHET UR QL SCN: NEGATIVE
ANION GAP SERPL CALC-SCNC: 7 MMOL/L (ref 0–18)
BARBITURATES UR QL SCN: NEGATIVE
BASOPHILS # BLD AUTO: 0.04 X10(3) UL (ref 0–0.2)
BASOPHILS NFR BLD AUTO: 0.4 %
BUN BLD-MCNC: 10 MG/DL (ref 7–18)
BUN/CREAT SERPL: 8.6 (ref 10–20)
CALCIUM BLD-MCNC: 8.9 MG/DL (ref 8.5–10.1)
CHLORIDE SERPL-SCNC: 105 MMOL/L (ref 98–112)
CO2 SERPL-SCNC: 26 MMOL/L (ref 21–32)
COCAINE UR QL: NEGATIVE
CREAT BLD-MCNC: 1.16 MG/DL
CREAT UR-SCNC: 205 MG/DL
DEPRECATED RDW RBC AUTO: 40.6 FL (ref 35.1–46.3)
EOSINOPHIL # BLD AUTO: 0.06 X10(3) UL (ref 0–0.7)
EOSINOPHIL NFR BLD AUTO: 0.6 %
ERYTHROCYTE [DISTWIDTH] IN BLOOD BY AUTOMATED COUNT: 14 % (ref 11–15)
GFR SERPLBLD BASED ON 1.73 SQ M-ARVRAT: 83 ML/MIN/1.73M2 (ref 60–?)
GLUCOSE BLD-MCNC: 102 MG/DL (ref 70–99)
HCT VFR BLD AUTO: 49.2 %
HGB BLD-MCNC: 16.6 G/DL
IMM GRANULOCYTES # BLD AUTO: 0.03 X10(3) UL (ref 0–1)
IMM GRANULOCYTES NFR BLD: 0.3 %
LYMPHOCYTES # BLD AUTO: 0.88 X10(3) UL (ref 1–4)
LYMPHOCYTES NFR BLD AUTO: 9.1 %
MAGNESIUM SERPL-MCNC: 2.6 MG/DL (ref 1.6–2.6)
MCH RBC QN AUTO: 27.3 PG (ref 26–34)
MCHC RBC AUTO-ENTMCNC: 33.7 G/DL (ref 31–37)
MCV RBC AUTO: 80.8 FL
MDMA UR QL SCN: NEGATIVE
METHADONE UR QL SCN: NEGATIVE
MONOCYTES # BLD AUTO: 0.69 X10(3) UL (ref 0.1–1)
MONOCYTES NFR BLD AUTO: 7.1 %
NEUTROPHILS # BLD AUTO: 7.99 X10 (3) UL (ref 1.5–7.7)
NEUTROPHILS # BLD AUTO: 7.99 X10(3) UL (ref 1.5–7.7)
NEUTROPHILS NFR BLD AUTO: 82.5 %
OPIATES UR QL SCN: NEGATIVE
OSMOLALITY SERPL CALC.SUM OF ELEC: 285 MOSM/KG (ref 275–295)
OXYCODONE UR QL SCN: NEGATIVE
PCP UR QL SCN: NEGATIVE
PLATELET # BLD AUTO: 299 10(3)UL (ref 150–450)
POTASSIUM SERPL-SCNC: 3.6 MMOL/L (ref 3.5–5.1)
RBC # BLD AUTO: 6.09 X10(6)UL
SARS-COV-2 RNA RESP QL NAA+PROBE: NOT DETECTED
SODIUM SERPL-SCNC: 138 MMOL/L (ref 136–145)
TSI SER-ACNC: 0.54 MIU/ML (ref 0.36–3.74)
WBC # BLD AUTO: 9.7 X10(3) UL (ref 4–11)

## 2022-09-29 PROCEDURE — 99214 OFFICE O/P EST MOD 30 MIN: CPT | Performed by: OTHER

## 2022-09-29 PROCEDURE — 99443 PHONE E/M BY PHYS 21-30 MIN: CPT | Performed by: INTERNAL MEDICINE

## 2022-09-29 RX ORDER — ALPRAZOLAM 0.5 MG/1
0.5 TABLET ORAL 2 TIMES DAILY PRN
Status: DISCONTINUED | OUTPATIENT
Start: 2022-09-29 | End: 2022-09-30

## 2022-09-29 RX ORDER — DIAZEPAM 5 MG/ML
5 INJECTION, SOLUTION INTRAMUSCULAR; INTRAVENOUS ONCE
Status: DISCONTINUED | OUTPATIENT
Start: 2022-09-29 | End: 2022-09-30

## 2022-09-29 RX ORDER — HYDRALAZINE HYDROCHLORIDE 20 MG/ML
10 INJECTION INTRAMUSCULAR; INTRAVENOUS EVERY 6 HOURS PRN
Status: DISCONTINUED | OUTPATIENT
Start: 2022-09-29 | End: 2022-09-30

## 2022-09-29 RX ORDER — ACETAMINOPHEN 325 MG/1
650 TABLET ORAL EVERY 6 HOURS PRN
Status: DISCONTINUED | OUTPATIENT
Start: 2022-09-29 | End: 2022-09-30

## 2022-09-29 RX ORDER — TEMAZEPAM 15 MG/1
30 CAPSULE ORAL ONCE
Status: COMPLETED | OUTPATIENT
Start: 2022-09-29 | End: 2022-09-30

## 2022-09-29 NOTE — ED INITIAL ASSESSMENT (HPI)
Patient with insomnia x9 days, which he has seen a doctor and given sleeping pill without relief. Wife states that he sleeps for one hour and then awakes with high blood pressure and increased heart rate.

## 2022-09-29 NOTE — TELEPHONE ENCOUNTER
I will change prescription to stat, but please inform the patient there is a high chance that it will not be approved by his insurance and he might need to pay for it out-of-pocket.

## 2022-09-29 NOTE — ED QUICK NOTES
Orders for admission, patient is aware of plan and ready to go upstairs. Any questions, please call ED RN Bela at [phone].      Patient Covid vaccination status: Unvaccinated     COVID Test Ordered in ED: Rapid SARS-CoV-2 by PCR    COVID Suspicion at Admission: Low clinical suspicion for COVID    Running Infusions:  None    Mental Status/LOC at time of transport: aox4    Other pertinent information:   CIWA score: N/A   NIH score:  N/A

## 2022-09-29 NOTE — TELEPHONE ENCOUNTER
Scheduled for:  Colonoscopy 82001 EGD [id number]  Provider Name:  Dr Ruchi Johnson  Date:  10/31/2022- Ok'd by Melvin and Dr Ruchi Johnson   Location:  [address] [place] [address]  Sedation:  MAC  Time:  2:15pm ( pt is aware arrival time is at 1:15pm)  Prep: Miralax  Meds/Allergies Reconciled?:  Physician Reviewed  Diagnosis with codes:  CAN D50.9  Was patient informed to call insurance with codes (Y/N):  Yes   Referral sent?:  Referral was sent at the time of electronic surgical scheduling. [address] [place] [address] or [address] notified?:  I sent an electronic request to Endo Scheduling and received a confirmation today. Medication Orders: HOLD Iron 3 days prior to the procedure. Pt is aware to NOT take iron pills, herbal meds and diet supplements for 7 days before exam. Also to NOT take any form of alcohol, recreational drugs and any forms of ED meds 24 hours before exam.   Misc Orders:  Patient was informed that they will need a COVID 19 test prior to their procedure. Patient verbally understood & will await a phone call from [facility] to schedule. Further instructions given by staff:  I provide prep instructions to patient at the time of the appointment and reviewed date, time and location, he verbalized that he understood and is aware to call if he has any questions.

## 2022-09-29 NOTE — TELEPHONE ENCOUNTER
Patient seen recently in the office on 9/21/22 for insomnia, anxiety and was prescribed sertraline 50 MG for which he only took for 3 days as it made him \"feel loopy. \" He stopped taking that. He was also prescribed zolpidem 10 MG and takes that about a half hour before bed and goes to sleep but is up about an hour later feeling anxiety. He was also prescribed ALPRAZolam 0.5 MG and takes that as needed. Per patient he saw a psychiatrist for the first time yesterday and has an appointment later today with a counselor. Per patient he has been having high b/p readings when he takes his b/p with an arm cuff at home with readings of 170/100, 170/96 with rotating arms. Patient's wife was also on the call and \" advised that is pacing the house all night and hasn't slept in days and she feels he's in crisis mode. \" Scheduled a telephone visit with pcp for today at 10:45 to discuss further.

## 2022-09-29 NOTE — PROGRESS NOTES
Virtual Telephone Check-In    Kayla Jones verbally consents to a Virtual/Telephone Check-In visit on 09/29/22. Patient has been referred to the [facility] website at [email or web address] to review the yearly Consent to Treat document. Patient understands and accepts financial responsibility for any deductible, co-insurance and/or co-pays associated with this service. Duration of the service: 25 minutes      Summary of topics discussed: Patient called me today with complaint of continued symptoms he says he is unable to sleep  He gets 1 hr or so sleep and then he wakes up tremors lately and also his heart has been pounding blood pressure has been elevated this is on and off. Patient had seen neurology recently notes and recommendations seen MRI of the brain with and without contrast was ordered patient very concerned that something really wrong with him. 1. Palpitations- ? We will need to do a work-up with the symptoms the patient is having is best that we get him admitted and do some further work-up we can get to cardiology get the echo done possible involve psych and neuro again  Asked patient to go to ER      2. Elevated blood pressure reading as above we will follow with ER we will get him admitted      3. Tremors of nervous system? Seen neuro MRI ordered we will decide if we need to do the MRI while in hospital      4.  Insomnia, unspecified type we will get psych on board to help medications given so far has not helped          Suyapa Lee MD

## 2022-09-29 NOTE — ED QUICK NOTES
Patient presents for complaints of difficulty sleeping for the last 9 days. Patient reports he wakes up after sleeping for a short time with palpitations and increased blood pressure. Patient states that he has tried multiple medications for sleep and none seem to work. Patient also states that these palpitations happen throughout the day. Denies SI and HI.

## 2022-09-29 NOTE — TELEPHONE ENCOUNTER
Was MRI approved by his insurance? Because if we change to stat and if is not approved he will get stuck with a bill.

## 2022-09-29 NOTE — TELEPHONE ENCOUNTER
Called central scheduling to schedule patient for stat MRI. Pt is currently being admitted to ED- left message for wife letting her know below information.

## 2022-09-29 NOTE — TELEPHONE ENCOUNTER
Patient states he believes he missed a call from Dr. Harman Isaac, call transferred to Professor Nina Sanchez 192

## 2022-09-29 NOTE — PATIENT INSTRUCTIONS
1.  Schedule CT scan of the abdomen and pelvis. 2.  Schedule colonoscopy and upper endoscopy for an iron deficiency anemia following a split dose MiraLAX/Gatorade preparation and monitored anesthesia care. 3.  Hold iron for 72 hours preceding the colonoscopy. 4.  I will contact the psychologist regarding the anxiety.

## 2022-09-29 NOTE — TELEPHONE ENCOUNTER
The order for Brain MRI w+wo ([id number]) placed 9/26/22 will not be initiated until the patient is scheduled as this is the process for [facility] software

## 2022-09-29 NOTE — TELEPHONE ENCOUNTER
Patient called and want's his MRIs changed to   \"Stat\"; he wants to get in today.   Please call his wife at [phone]

## 2022-09-30 ENCOUNTER — APPOINTMENT (OUTPATIENT)
Dept: CV DIAGNOSTICS | Facility: HOSPITAL | Age: 37
End: 2022-09-30
Attending: INTERNAL MEDICINE
Payer: COMMERCIAL

## 2022-09-30 ENCOUNTER — APPOINTMENT (OUTPATIENT)
Dept: MRI IMAGING | Facility: HOSPITAL | Age: 37
End: 2022-09-30
Attending: INTERNAL MEDICINE
Payer: COMMERCIAL

## 2022-09-30 VITALS
WEIGHT: 176.63 LBS | DIASTOLIC BLOOD PRESSURE: 77 MMHG | OXYGEN SATURATION: 99 % | HEIGHT: 73 IN | BODY MASS INDEX: 23.41 KG/M2 | TEMPERATURE: 98 F | SYSTOLIC BLOOD PRESSURE: 129 MMHG | HEART RATE: 88 BPM | RESPIRATION RATE: 17 BRPM

## 2022-09-30 PROBLEM — F41.0 GENERALIZED ANXIETY DISORDER WITH PANIC ATTACKS: Status: ACTIVE | Noted: 2022-09-30

## 2022-09-30 PROBLEM — F45.0 SOMATIZATION DISORDER: Status: ACTIVE | Noted: 2022-09-30

## 2022-09-30 PROBLEM — F41.1 GENERALIZED ANXIETY DISORDER WITH PANIC ATTACKS: Status: ACTIVE | Noted: 2022-09-30

## 2022-09-30 LAB
ALBUMIN SERPL-MCNC: 3.9 G/DL (ref 3.4–5)
ALBUMIN/GLOB SERPL: 1.2 {RATIO} (ref 1–2)
ALP LIVER SERPL-CCNC: 60 U/L
ALT SERPL-CCNC: 19 U/L
ANION GAP SERPL CALC-SCNC: 4 MMOL/L (ref 0–18)
AST SERPL-CCNC: 10 U/L (ref 15–37)
BILIRUB SERPL-MCNC: 0.7 MG/DL (ref 0.1–2)
BUN BLD-MCNC: 11 MG/DL (ref 7–18)
BUN/CREAT SERPL: 8.9 (ref 10–20)
CALCIUM BLD-MCNC: 9.2 MG/DL (ref 8.5–10.1)
CHLORIDE SERPL-SCNC: 105 MMOL/L (ref 98–112)
CO2 SERPL-SCNC: 30 MMOL/L (ref 21–32)
CREAT BLD-MCNC: 1.23 MG/DL
GFR SERPLBLD BASED ON 1.73 SQ M-ARVRAT: 78 ML/MIN/1.73M2 (ref 60–?)
GLOBULIN PLAS-MCNC: 3.3 G/DL (ref 2.8–4.4)
GLUCOSE BLD-MCNC: 91 MG/DL (ref 70–99)
OSMOLALITY SERPL CALC.SUM OF ELEC: 287 MOSM/KG (ref 275–295)
POTASSIUM SERPL-SCNC: 3.8 MMOL/L (ref 3.5–5.1)
PROT SERPL-MCNC: 7.2 G/DL (ref 6.4–8.2)
SODIUM SERPL-SCNC: 139 MMOL/L (ref 136–145)

## 2022-09-30 PROCEDURE — 70553 MRI BRAIN STEM W/O & W/DYE: CPT | Performed by: INTERNAL MEDICINE

## 2022-09-30 PROCEDURE — 93306 TTE W/DOPPLER COMPLETE: CPT | Performed by: INTERNAL MEDICINE

## 2022-09-30 PROCEDURE — 90792 PSYCH DIAG EVAL W/MED SRVCS: CPT | Performed by: OTHER

## 2022-09-30 PROCEDURE — 99236 HOSP IP/OBS SAME DATE HI 85: CPT | Performed by: INTERNAL MEDICINE

## 2022-09-30 PROCEDURE — 99204 OFFICE O/P NEW MOD 45 MIN: CPT | Performed by: INTERNAL MEDICINE

## 2022-09-30 RX ORDER — ATORVASTATIN CALCIUM 40 MG/1
40 TABLET, FILM COATED ORAL NIGHTLY
Qty: 30 TABLET | Refills: 2 | Status: SHIPPED | OUTPATIENT
Start: 2022-09-30

## 2022-09-30 RX ORDER — METOPROLOL SUCCINATE 25 MG/1
25 TABLET, EXTENDED RELEASE ORAL
Status: DISCONTINUED | OUTPATIENT
Start: 2022-10-01 | End: 2022-09-30

## 2022-09-30 RX ORDER — CLONAZEPAM 1 MG/1
1 TABLET ORAL NIGHTLY
Qty: 30 TABLET | Refills: 0 | Status: SHIPPED | OUTPATIENT
Start: 2022-09-30

## 2022-09-30 RX ORDER — ATORVASTATIN CALCIUM 40 MG/1
40 TABLET, FILM COATED ORAL NIGHTLY
Status: DISCONTINUED | OUTPATIENT
Start: 2022-09-30 | End: 2022-09-30

## 2022-09-30 RX ORDER — METOPROLOL SUCCINATE 25 MG/1
25 TABLET, EXTENDED RELEASE ORAL
Qty: 30 TABLET | Refills: 2 | Status: SHIPPED | OUTPATIENT
Start: 2022-10-01

## 2022-09-30 RX ORDER — OLANZAPINE 5 MG/1
5 TABLET ORAL NIGHTLY
Qty: 30 TABLET | Refills: 0 | Status: SHIPPED | OUTPATIENT
Start: 2022-09-30 | End: 2022-10-04

## 2022-09-30 RX ORDER — CLONAZEPAM 1 MG/1
1 TABLET ORAL NIGHTLY
Status: DISCONTINUED | OUTPATIENT
Start: 2022-09-30 | End: 2022-09-30

## 2022-09-30 RX ORDER — CLONAZEPAM 0.5 MG/1
0.5 TABLET ORAL DAILY
Status: DISCONTINUED | OUTPATIENT
Start: 2022-09-30 | End: 2022-09-30

## 2022-09-30 RX ORDER — OLANZAPINE 2.5 MG/1
5 TABLET ORAL NIGHTLY
Status: DISCONTINUED | OUTPATIENT
Start: 2022-09-30 | End: 2022-09-30

## 2022-09-30 RX ORDER — CLONAZEPAM 0.5 MG/1
0.5 TABLET ORAL DAILY
Qty: 30 TABLET | Refills: 0 | Status: SHIPPED | OUTPATIENT
Start: 2022-09-30

## 2022-09-30 NOTE — CONSULTS
Consult dictated. Patient mended for insomnia, palpitations dealing with long COVID syndrome. No new neurological symptoms. Already under the care of Dr. Jason Villalobos. Was scheduled for an MRI scan which has not yet been completed. Will order to complete in the hospital.  Discussed with patient, wife, mother. Patient may benefit from a sleep consultation, sleep study. Asked him to follow-up with Dr. Jason Villalobos. No new neurological issues. We will sign off.

## 2022-09-30 NOTE — BH PROGRESS NOTE
Behavioral Health Note:  REASON FOR ADMISSION:   Insomnia    REASON FOR CONSULT:  Psychiatry consultation requested for evaluation and advice d/t increased anxiety, med recs, and insomnia    OBJECTIVE:  Aleah Baldwin is a  40year old male who presents d/t insomnia. He has PMH significant for NATTY, GERD and outpatient neurology follow up. UDS+ for benzos and cannabis. Psych consult ordered for Dr. Ulises Roberts and [address] [facility] met with Aleah Baldwin in presence of his wife Alice Mathur (with permission and consent) for initial consultation. Aleah Baldwin today presents as alert, oriented, and able to communicate appropriate with noticeable anxiety. CHIEF COMPLAINT:   Harpreet today reports that he's been increasingly anxious over the past 5 months d/t burn out at work, reporting that he often works up to 10-14 hours per day which includes driving all over the city 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 He reports that with the onset of medical issues and need for more tests and appointments he feels his anxiety has been increasing from that as well. Aleah Baldwin also reports that he is anxious at his baseline but since Easter has had first onset of panic attacks ~1x/month and feels he's had some borderline panic sx daily over the past 9 days. Harpreet reports anxious sx including racing thoughts/rumination, episodes of pacing, restlessness, SOB, heart palpitations, headaches, muscle tension, fidgeting and sweating. He reports that over the past three days after taking the medications he's not sure if he got blurry vision from those medications or panic sx. Aleah Baldwin reports the has felt very burnt out with work but denies current/hx of depression and this was also concurred by Alice Mathur.  Harpreet reports however that when he started taking the Zoloft, Xanax, and Ambien, he felt some brief episodes of rapid cycling, either feeling \"like a zombie\" or really energetic and doesn't know which medication caused what. He denies hx of bipolar disorder dx but reports that many members of his family have bipolar disorder dx and wanted to make sure that these medications don't possibly onset this. Harpreet reports that his anxiety and panic sx have exacerbated the current insomnia he's experiencing. He reports long term issues with sleep disturbances but has now had issues falling asleep over the past 9 days. He reports that he can't sleep more that 30-60 minutes and wakes up in a panic with the recently rx medications. Yunior Louis reports that his appetite has been decreased for a couple of months, he will eat food but struggles to actually feel hungry. He usually eats lunch and dinner and has never been a breakfast eater more than once a week. Yunior Louis denies issues with ADLs, denies hx delirium or AVH and denies SI/HI/SIB. PAST PSYCHIATRIC HISTORY:  Past diagnosis: NATTY, insomnia  Past inpatient: none  Past outpatient: neuropsychology within the past week via neurology referral  Medication: was put on zoloft 50mg, prn xanax . 5mg BID and prn ambient 10mg ~1 week ago and has never been on medication before    SOCIAL HISTORY:  Yunior Louis is a  40year old male who lives in [place] with his wife and three children (4, 3, 10 mo). He has been a  and contractor for 10 years. He has hx of social alcohol use >5 years ago and hasn't drank since. He has recent hx of indica cannabis gummies (10-30mg) to help with insomnia but they aren't working so he stopped using them. He has no hx of tobacco or illicit substance use.     MENTAL STATUS EXAM:  Appearance: stated age male sitting on hospital couch  Attitude/Coorperation: receptive, cooperative  Behavior: No psychomotor abnormality  Speech: normal rate, rhythm, and volume  Mood: anxious  Affect: congruent to mood  Conscious/Orientation: a/o x4 with good/fair attention and memory  Thought process: mixed linear and circumstantial  Thought content: intermittently asking about difference between psychiatry, neuropsych, and therapy  Perceptions: no hallucinations  Insight/Judgment: fair    SUICIDAL RISK ASSESSMENT  Harpreet denies SI/HI/SIB, CSSR=0, INDICATING LOW RISK    ASSESSMENT  Harpreet has a dx r/o unspecified anxiety disorder with panic attacks, r/o adjustment disorder, r/o unspecified insomnia    PLAN  1. Psych consult ordered for Dr. Aman Schofield  2. Referrals provided for outpatient therapy and psychiatry follow ups in discharge instructions    Susan MANZANARES LPC     Note to patient:  The [facility] makes medical notes like these available to patients in the interest of transparency. However, be advised this is a medical document. It is intended as peer to peer communication. It is written in medical language and may contain abbreviations or verbiage that are unfamiliar. It may appear blunt or direct. Medical documents are intended to carry relevant information, facts as evident, and the clinical opinion of the practitioner.

## 2022-09-30 NOTE — PROGRESS NOTES
Patient with orders to discharge home. Patient hesitant and with questions regarding orders for Zyprexa, psych MD made aware, education provided r/t medication purpose/side effects. MD made aware as well. AVS discussed with both patient and wife at bedside. Patient made aware of need to follow up with outpatient pysch and to follow up with primary. Wife to transport home.

## 2022-09-30 NOTE — PLAN OF CARE
Patient alert, oriented, vitals stable. Denies pain/discomfort. C/o anxiety, prn medications given with effectiveness. MRI, 2D echo completed. Self with adls, call light within reach, able to make needs known. Wife is at bedside, nursing update given. Problem: Patient Centered Care  Goal: Patient preferences are identified and integrated in the patient's plan of care  Description: Interventions:  - What would you like us to know as we care for you?  From home with family  - Provide timely, complete, and accurate information to patient/family  - Incorporate patient and family knowledge, values, beliefs, and cultural backgrounds into the planning and delivery of care  - Encourage patient/family to participate in care and decision-making at the level they choose  - Honor patient and family perspectives and choices  9/30/2022 1048 by Nikki Calderon RN  Outcome: Progressing  9/30/2022 1040 by Nikki Calderon RN  Outcome: Progressing     Problem: Patient/Family Goals  Goal: Patient/Family Long Term Goal  Description: Patient's Long Term Goal: discharge home    Interventions:  - neurology consult, pysch consult  - See additional Care Plan goals for specific interventions  9/30/2022 1048 by Nikki Calderon RN  Outcome: Progressing  9/30/2022 1040 by Nikki Calderon RN  Outcome: Progressing  Goal: Patient/Family Short Term Goal  Description: Patient's Short Term Goal: get some sleep    Interventions:   - prn medications, neuro consult, psych consult  - See additional Care Plan goals for specific interventions  9/30/2022 1048 by Nikki Calderon RN  Outcome: Progressing  9/30/2022 1040 by Nikki Calderon RN  Outcome: Progressing     Problem: PAIN - ADULT  Goal: Verbalizes/displays adequate comfort level or patient's stated pain goal  Description: INTERVENTIONS:  - Encourage pt to monitor pain and request assistance  - Assess pain using appropriate pain scale  - Administer analgesics based on type and severity of pain and evaluate response  - Implement non-pharmacological measures as appropriate and evaluate response  - Consider cultural and social influences on pain and pain management  - Manage/alleviate anxiety  - Utilize distraction and/or relaxation techniques  - Monitor for opioid side effects  - Notify MD/LIP if interventions unsuccessful or patient reports new pain  - Anticipate increased pain with activity and pre-medicate as appropriate  9/30/2022 1048 by Tristian John RN  Outcome: Progressing  9/30/2022 1040 by Tristian John RN  Outcome: Progressing     Problem: SAFETY ADULT - FALL  Goal: Free from fall injury  Description: INTERVENTIONS:  - Assess pt frequently for physical needs  - Identify cognitive and physical deficits and behaviors that affect risk of falls.   - [place] fall precautions as indicated by assessment.  - Educate pt/family on patient safety including physical limitations  - Instruct pt to call for assistance with activity based on assessment  - Modify environment to reduce risk of injury  - Provide assistive devices as appropriate  - Consider OT/PT consult to assist with strengthening/mobility  - Encourage toileting schedule  Outcome: Progressing     Problem: DISCHARGE PLANNING  Goal: Discharge to home or other facility with appropriate resources  Description: INTERVENTIONS:  - Identify barriers to discharge w/pt and caregiver  - Include patient/family/discharge partner in discharge planning  - Arrange for needed discharge resources and transportation as appropriate  - Identify discharge learning needs (meds, wound care, etc)  - Arrange for interpreters to assist at discharge as needed  - Consider post-discharge preferences of patient/family/discharge partner  - Complete POLST form as appropriate  - Assess patient's ability to be responsible for managing their own health  - Refer to Case Management Department for coordinating discharge planning if the patient needs post-hospital services based on physician/LIP order or complex needs related to functional status, cognitive ability or social support system  Outcome: Progressing     Problem: ANXIETY  Goal: Will report anxiety at manageable levels  Description: INTERVENTIONS:  - Administer medication as ordered  - Teach and rehearse alternative coping skills  - Provide emotional support with 1:1 interaction with staff  Outcome: Progressing

## 2022-09-30 NOTE — PLAN OF CARE
Problem: Patient Centered Care  Goal: Patient preferences are identified and integrated in the patient's plan of care  Description: Interventions:  - What would you like us to know as we care for you?  I live with my wife and children.  - Provide timely, complete, and accurate information to patient/family  - Incorporate patient and family knowledge, values, beliefs, and cultural backgrounds into the planning and delivery of care  - Encourage patient/family to participate in care and decision-making at the level they choose  - Honor patient and family perspectives and choices  Outcome: Progressing

## 2022-09-30 NOTE — ED QUICK NOTES
Transport at bedside. Patient aware of plan of care, verbalizes understanding. Brought to floor with belongings. Patients wife aware of plan.

## 2022-09-30 NOTE — CONSULTS
[facility]    PATIENT'S NAME: Mike Mcneal   ATTENDING PHYSICIAN: Elisa Salgado MD   CONSULTING PHYSICIAN: Vishal Preston MD   PATIENT ACCOUNT#:   [id number]    LOCATION:  [address] [place] [address] Loop #:   [id number]       YOB: 1985  ADMISSION DATE:       09/29/2022      CONSULT DATE:  09/29/2022    REPORT OF CONSULTATION      HISTORY OF PRESENT ILLNESS:  A 26-year-old gentleman admitted for insomnia of 9 days, palpitations, long COVID syndrome. Patient has already been evaluated by Dr. Trina Nelson. Patient's wife concerned about insomnia, high blood pressure, tachycardia, palpitations. Patient also describes tremors inside the body. No visual tremors. Mother has a relative who has essential tremors. He denies headache, neck pain, low back pain. No focal motor or sensory symptoms in the arms or legs. No history of TIA, CVA, loss of consciousness, seizure. At the present time, no notes from ER physician or H and P in the chart to review. I did review Dr. Keegan Garcia note of September 26. Other symptoms at the present time is generalized fatigue, decreased appetite, withdrawn. PAST MEDICAL HISTORY:  GERD. PAST SURGICAL HISTORY:  Hernia surgery. MEDICATIONS:  Present medications:  Reviewed. ALLERGIES:  Cefaclor. SOCIAL HISTORY:  He does not smoke, drink alcohol or use illegal drugs. REVIEW OF SYSTEMS:  At the present time, denies chest pain, shortness of breath, bowel or bladder, peripheral vascular symptoms, fever, sweats, chills, weight loss. PHYSICAL EXAMINATION:    VITAL SIGNS:  As recorded in the chart. Temperature 97.2, pulse 70, respiratory rate 18, blood pressure 152/92, pulse ox 98%. NEUROLOGIC:  Mini-Mental Status exam is normal.  Speech and language intact. Visual fields are full. Cranial nerves normal.  Strength in the arms and legs is normal.  Finger-to-nose is normal.  Reflexes symmetric.   Joint position sense and vibration normal.  Tone is normal.  No tremors. LABORATORY DATA:  Reviewed. IMPRESSION:  Insomnia, palpitations, anxiety, feeling tremors inside. No evidence for essential tremor. Sleep consultation or sleep study may be beneficial.      I will order MRI of the brain. He denies any new neurological symptoms. He does have a followup appointment with Dr. Zoran Abrams. Thank you for the consult.     Dictated By Charanjit Armenta MD  d: 09/29/2022 22:51:46  t: 09/30/2022 01:30:01  Job [id number]  [facility]/

## 2022-09-30 NOTE — PROGRESS NOTES
MRI report reviewed. Cardiology consult reviewed. Spoke with Dr. Chantel Gilliland in office. Patient not reexamined today. No additional neurological recommendations in the hospital.  Please have her follow-up with neurology in the outpatient setting. We will sign off.

## 2022-10-03 ENCOUNTER — PATIENT OUTREACH (OUTPATIENT)
Dept: CASE MANAGEMENT | Age: 37
End: 2022-10-03

## 2022-10-05 LAB
CREATININE, URINE - PER VOLUME: 198 MG/DL
METANEPHRINE, UR- RATIO TO CRT: 176 UG/G CRT
METANEPHRINE, URINE-PER VOLUME: 349 UG/L
NORMETANEPHRINE, UR-PER VOLUME: 235 UG/L
NORMETANEPHRINE, URINE - RATIO: 119 UG/G CRT

## 2022-10-07 LAB
METANEPHRINE: 0.43 NMOL/L
NORMETANEPHRINE: 0.28 NMOL/L

## 2022-10-09 ENCOUNTER — PATIENT MESSAGE (OUTPATIENT)
Dept: NEUROLOGY | Facility: CLINIC | Age: 37
End: 2022-10-09

## 2022-10-09 DIAGNOSIS — R25.1 TREMOR: ICD-10-CM

## 2022-10-09 DIAGNOSIS — G25.9 MOVEMENT DISORDER: Primary | ICD-10-CM

## 2022-10-10 NOTE — TELEPHONE ENCOUNTER
From: Mesfin Johnston  To: Minh Lucas MD  Sent: 10/9/2022 1:58 PM CDT  Subject: Body Twitching     Hi Dr Benedicto Luna:    I still feel like my nervous system is being attacked as I was explaining when we met for first time. I have body twitching that started in my arms shoulders and thighs. I am on new meds since be admitted to hospital last week to monitor blood pressure, anxiety and sleep and I know they can cause twitching, but I had twitching before I even started meds but didn't think anything of it because we were looking into low iron levels and other symptoms. Recently the twitches are all over my body now; arms, thighs, torso, calves, feet and left eye. Other symptoms:  No appetite (for months)  Insomnia (for months even with ambian, thc, melatonin)  Brain Fog      Were you able to review MRI? Can you look at all the other testing I have done in [facility] and help connect the dots? Is there other nerve or muscle testing I can so before we meet later this month? I am trying to be proactive because I still feel like something is wrong with nervous system. I was mentioning when I had Covid I had seizure like bouts in middle of night with bad night sweats as if something was attacking my nervous system. I have never been the same. Even with all the labs and test I have had done would relieve anxiety but new symptoms or unaddressed symptoms need to be addressed. Please advise.

## 2022-10-10 NOTE — TELEPHONE ENCOUNTER
Noted.  Order has been faxed to [facility] @ [phone]. Reviewed and electronically signed by:  500 [facility], [address] [place] [address], Pending sale to [facility]

## 2022-10-10 NOTE — TELEPHONE ENCOUNTER
Message to Dr. Alissa Kennedy to review and advise. The patient was placed on clonazepam 0.5mg - take one tablet daily. Clonazepam 1mg - take nightly, metoprolol 25mg - once daily, and olanzapine 5mg - once daily. Reviewed and electronically signed by:  [address], [facility]

## 2022-10-10 NOTE — TELEPHONE ENCOUNTER
I spoke with the patient and informed him of the information below. Patient verbalized understanding and agrees to go to a tertiary center. The order for [facility] is pending for Dr. Jinny Phalen to review and sign if agreeable. Reviewed and electronically signed by:  [address] [place] [address], [facility]

## 2022-10-10 NOTE — TELEPHONE ENCOUNTER
MRI was not revealing. I am not able to connect any doubts with his symptoms. Therefore there probably is an urgency to have him seen at the tertiary center to get a more specialized assessment. It is possible to offer the patient a referral to a tertiary center?

## 2022-10-11 ENCOUNTER — PATIENT MESSAGE (OUTPATIENT)
Dept: NEUROLOGY | Facility: CLINIC | Age: 37
End: 2022-10-11

## 2022-10-11 DIAGNOSIS — G25.9 MOVEMENT DISORDER: Primary | ICD-10-CM

## 2022-10-11 DIAGNOSIS — R25.1 TREMOR: ICD-10-CM

## 2022-10-11 NOTE — TELEPHONE ENCOUNTER
I spoke with the patient and he now states that he would like an order placed for [facility] so that until he can decide which location takes his insurance. The order is pending for Dr. Alissa Kennedy to review and sign if agreeable. I informed the patient his records have not been sent since he has not scheduled an appointment at a facility. Informed the patient that once he schedules an appointment, we can send his records if there are not able to view them via Care Everywhere. Patient verbalized understanding. Message to Dr. Alissa Kennedy to review the pending order if agreeable. Thanks. Reviewed and electronically signed by:  500 [facility], [address] [place] [address], [facility]

## 2022-10-12 NOTE — TELEPHONE ENCOUNTER
Signed the order for [facility] [place].  Please address his concerns about his records.

## 2022-10-13 ENCOUNTER — PATIENT MESSAGE (OUTPATIENT)
Dept: NEUROLOGY | Facility: CLINIC | Age: 37
End: 2022-10-13

## 2022-10-13 NOTE — TELEPHONE ENCOUNTER
Spoke to patient and informed him all paper work will be faxed to Dr. Luca Duran office. Office visit note and all imaging faxed to Dr. Luca Duran office at [phone]. Confirmation received.

## 2022-10-14 NOTE — TELEPHONE ENCOUNTER
Detailed message has been left on the patient voicemail informing him his records were faxed to Dr. Bridget Valenuzela office yesterday, and today. I spoke with the physicians office and informed them the records have been sent. I was given another fax # of [phone] to also send the records in case they were not received on the other end. Records were sent to the requested fax. Confirmation received of successful transmission. Reviewed and electronically signed by:  [address], [facility]

## 2022-10-21 ENCOUNTER — PATIENT MESSAGE (OUTPATIENT)
Facility: CLINIC | Age: 37
End: 2022-10-21

## 2022-10-24 ENCOUNTER — TELEPHONE (OUTPATIENT)
Dept: GASTROENTEROLOGY | Facility: CLINIC | Age: 37
End: 2022-10-24

## 2022-10-24 ENCOUNTER — HOSPITAL ENCOUNTER (OUTPATIENT)
Dept: CT IMAGING | Age: 37
Discharge: HOME | End: 2022-10-24
Attending: INTERNAL MEDICINE
Payer: COMMERCIAL

## 2022-10-24 DIAGNOSIS — E61.1 IRON DEFICIENCY: ICD-10-CM

## 2022-10-24 PROCEDURE — 74177 CT ABD & PELVIS W/CONTRAST: CPT | Performed by: INTERNAL MEDICINE

## 2022-10-24 NOTE — TELEPHONE ENCOUNTER
Dr. Myles Godoy,    Call transferred from phone room. I spoke to patient who states today he had two bowel movements today and the 2nd one he noticed bright red blood in his formed stool and when wiping. He was concerned because he has not seen this before. Denies abdominal pain, fever, dizziness/light-headedness. Has felt fatigued for last couple months. Prior to this he was more constipated going every 2-3 days. Patient is scheduled for CT today at 4pm and wants to know if he should keep this or schedule another test. He also has cln/egd on 10/31. I reviewed symptoms prompting ED evaluation. Please advise, thank you.

## 2022-10-24 NOTE — TELEPHONE ENCOUNTER
The character of the bleeding does not suggest a severe bleeding episode. And less he is having frequent bloody bowel movements, he may proceed with the CT scan. He has an appointment for a colonoscopy next week which she should keep.

## 2022-10-24 NOTE — TELEPHONE ENCOUNTER
Contacted patient and verified . Reviewed note below which he verbalized understadning of. On his way to CT, requested call back with results. He wanted me to note that he also has had a couple episodes over the past 6-7 months of sweating at night. Soaked sheets zuleika with shirt off and window open.     Da Bryan

## 2022-10-24 NOTE — TELEPHONE ENCOUNTER
Carmelita Escalera  12:11 PM                  Patient is calling to follow up and is requesting to speak to nurse regarding this issue.

## 2022-10-27 ENCOUNTER — PATIENT MESSAGE (OUTPATIENT)
Facility: CLINIC | Age: 37
End: 2022-10-27

## 2022-10-27 DIAGNOSIS — R53.83 FATIGUE, UNSPECIFIED TYPE: ICD-10-CM

## 2022-10-27 DIAGNOSIS — R61 NIGHT SWEATS: Primary | ICD-10-CM

## 2022-10-27 NOTE — TELEPHONE ENCOUNTER
I spoke to Blanche. Symptoms as below. He has not had a bowel movement since his episode of bleeding on Monday. He has night sweats which are worsening and he feels fatigued. We reviewed his CT results. There are no signs of intestinal inflammation or obvious neoplasm. There are no signs of obstruction. Uncomplicated diverticulosis was present. Atherosclerosis was present as well. The patient's lipid panel was abnormal previously. I would defer to Dr. Jaime Cortes. In light of the patient's night sweats and fatigue I am recommending that we repeat a CBC, chemistries and obtain an ESR and CRP. Blanche will proceed with his colonoscopy and upper endoscopy on Monday as scheduled. Dr. Jaime Cortes, please advise the patient on his atherosclerosis.

## 2022-10-28 ENCOUNTER — LAB ENCOUNTER (OUTPATIENT)
Dept: LAB | Facility: HOSPITAL | Age: 37
End: 2022-10-28
Attending: INTERNAL MEDICINE
Payer: COMMERCIAL

## 2022-10-28 DIAGNOSIS — Z01.818 PRE-OP TESTING: ICD-10-CM

## 2022-10-28 DIAGNOSIS — R61 NIGHT SWEATS: ICD-10-CM

## 2022-10-28 DIAGNOSIS — R53.83 FATIGUE, UNSPECIFIED TYPE: ICD-10-CM

## 2022-10-28 LAB
ALBUMIN SERPL-MCNC: 3.8 G/DL (ref 3.4–5)
ALBUMIN/GLOB SERPL: 1 {RATIO} (ref 1–2)
ALP LIVER SERPL-CCNC: 76 U/L
ALT SERPL-CCNC: 22 U/L
ANION GAP SERPL CALC-SCNC: 5 MMOL/L (ref 0–18)
AST SERPL-CCNC: 13 U/L (ref 15–37)
BASOPHILS # BLD AUTO: 0.04 X10(3) UL (ref 0–0.2)
BASOPHILS NFR BLD AUTO: 0.6 %
BILIRUB SERPL-MCNC: 0.4 MG/DL (ref 0.1–2)
BUN BLD-MCNC: 14 MG/DL (ref 7–18)
BUN/CREAT SERPL: 12.2 (ref 10–20)
CALCIUM BLD-MCNC: 9.3 MG/DL (ref 8.5–10.1)
CHLORIDE SERPL-SCNC: 107 MMOL/L (ref 98–112)
CO2 SERPL-SCNC: 28 MMOL/L (ref 21–32)
CREAT BLD-MCNC: 1.15 MG/DL
CRP SERPL-MCNC: <0.29 MG/DL (ref ?–0.3)
DEPRECATED RDW RBC AUTO: 42.5 FL (ref 35.1–46.3)
EOSINOPHIL # BLD AUTO: 0.14 X10(3) UL (ref 0–0.7)
EOSINOPHIL NFR BLD AUTO: 2.1 %
ERYTHROCYTE [DISTWIDTH] IN BLOOD BY AUTOMATED COUNT: 13.8 % (ref 11–15)
ERYTHROCYTE [SEDIMENTATION RATE] IN BLOOD: 12 MM/HR
FASTING STATUS PATIENT QL REPORTED: YES
GFR SERPLBLD BASED ON 1.73 SQ M-ARVRAT: 84 ML/MIN/1.73M2 (ref 60–?)
GLOBULIN PLAS-MCNC: 3.7 G/DL (ref 2.8–4.4)
GLUCOSE BLD-MCNC: 102 MG/DL (ref 70–99)
HCT VFR BLD AUTO: 48.3 %
HGB BLD-MCNC: 16 G/DL
IMM GRANULOCYTES # BLD AUTO: 0.02 X10(3) UL (ref 0–1)
IMM GRANULOCYTES NFR BLD: 0.3 %
LYMPHOCYTES # BLD AUTO: 0.91 X10(3) UL (ref 1–4)
LYMPHOCYTES NFR BLD AUTO: 13.4 %
MCH RBC QN AUTO: 27.9 PG (ref 26–34)
MCHC RBC AUTO-ENTMCNC: 33.1 G/DL (ref 31–37)
MCV RBC AUTO: 84.3 FL
MONOCYTES # BLD AUTO: 0.51 X10(3) UL (ref 0.1–1)
MONOCYTES NFR BLD AUTO: 7.5 %
NEUTROPHILS # BLD AUTO: 5.16 X10 (3) UL (ref 1.5–7.7)
NEUTROPHILS # BLD AUTO: 5.16 X10(3) UL (ref 1.5–7.7)
NEUTROPHILS NFR BLD AUTO: 76.1 %
OSMOLALITY SERPL CALC.SUM OF ELEC: 291 MOSM/KG (ref 275–295)
PLATELET # BLD AUTO: 252 10(3)UL (ref 150–450)
POTASSIUM SERPL-SCNC: 4.1 MMOL/L (ref 3.5–5.1)
PROT SERPL-MCNC: 7.5 G/DL (ref 6.4–8.2)
RBC # BLD AUTO: 5.73 X10(6)UL
SARS-COV-2 RNA RESP QL NAA+PROBE: NOT DETECTED
SODIUM SERPL-SCNC: 140 MMOL/L (ref 136–145)
WBC # BLD AUTO: 6.8 X10(3) UL (ref 4–11)

## 2022-10-28 PROCEDURE — 85652 RBC SED RATE AUTOMATED: CPT

## 2022-10-28 PROCEDURE — 80053 COMPREHEN METABOLIC PANEL: CPT

## 2022-10-28 PROCEDURE — 85025 COMPLETE CBC W/AUTO DIFF WBC: CPT

## 2022-10-28 PROCEDURE — 86140 C-REACTIVE PROTEIN: CPT

## 2022-10-28 PROCEDURE — 36415 COLL VENOUS BLD VENIPUNCTURE: CPT

## 2022-10-29 ENCOUNTER — TELEPHONE (OUTPATIENT)
Dept: INTERNAL MEDICINE CLINIC | Facility: CLINIC | Age: 37
End: 2022-10-29

## 2022-10-29 NOTE — TELEPHONE ENCOUNTER
Dr. Brasher Angles - Patient asking for your review of lab results (done yesterday) and CT Test results ordered by GI, Dr. Rakan Ferro. Please--- also read patient's MyChart, included in previous RN note below. Please advise       RN called patient to triage. Patient's date of birth and full name both confirmed. Denies new or worsening symptoms. Denies rectal bleeding. Finally, had 1 bowel movement last night. Denies abdominal distention, nausea, vomiting. Denies pain. Denies yellowing of sclera. Denies fever. But had night sweats, fatigue    He had questions regarding bowel movement last night and proceeding with procedure on Monday. RN advised him to contact GI office today to inquire. He verbalizes understanding of all information, and agreeable to plan. RN advised if symptoms get severely worse, patient should seek care at Emergency Room or Immediate Care. RN also informed patient to seek immediate medical attention at ER if patient experiences severe/worsening symptoms, shortness of breath, chest pain, or severe pain. Patient verbalizes understanding and is agreeable to instructions.

## 2022-10-31 ENCOUNTER — ANESTHESIA EVENT (OUTPATIENT)
Dept: ENDOSCOPY | Facility: HOSPITAL | Age: 37
End: 2022-10-31
Payer: COMMERCIAL

## 2022-10-31 ENCOUNTER — HOSPITAL ENCOUNTER (OUTPATIENT)
Facility: HOSPITAL | Age: 37
Setting detail: HOSPITAL OUTPATIENT SURGERY
Discharge: HOME | End: 2022-10-31
Attending: INTERNAL MEDICINE | Admitting: INTERNAL MEDICINE
Payer: COMMERCIAL

## 2022-10-31 ENCOUNTER — ANESTHESIA (OUTPATIENT)
Dept: ENDOSCOPY | Facility: HOSPITAL | Age: 37
End: 2022-10-31
Payer: COMMERCIAL

## 2022-10-31 VITALS
RESPIRATION RATE: 15 BRPM | SYSTOLIC BLOOD PRESSURE: 102 MMHG | WEIGHT: 180 LBS | HEIGHT: 73 IN | BODY MASS INDEX: 23.86 KG/M2 | OXYGEN SATURATION: 95 % | HEART RATE: 53 BPM | DIASTOLIC BLOOD PRESSURE: 60 MMHG | TEMPERATURE: 98 F

## 2022-10-31 DIAGNOSIS — D50.9 IRON DEFICIENCY ANEMIA, UNSPECIFIED IRON DEFICIENCY ANEMIA TYPE: ICD-10-CM

## 2022-10-31 DIAGNOSIS — Z01.818 PRE-OP TESTING: Primary | ICD-10-CM

## 2022-10-31 PROCEDURE — 43239 EGD BIOPSY SINGLE/MULTIPLE: CPT | Performed by: INTERNAL MEDICINE

## 2022-10-31 PROCEDURE — 45385 COLONOSCOPY W/LESION REMOVAL: CPT | Performed by: INTERNAL MEDICINE

## 2022-10-31 PROCEDURE — 0DBL8ZX EXCISION OF TRANSVERSE COLON, VIA NATURAL OR ARTIFICIAL OPENING ENDOSCOPIC, DIAGNOSTIC: ICD-10-PCS | Performed by: INTERNAL MEDICINE

## 2022-10-31 PROCEDURE — 0DB38ZX EXCISION OF LOWER ESOPHAGUS, VIA NATURAL OR ARTIFICIAL OPENING ENDOSCOPIC, DIAGNOSTIC: ICD-10-PCS | Performed by: INTERNAL MEDICINE

## 2022-10-31 PROCEDURE — 0DB78ZX EXCISION OF STOMACH, PYLORUS, VIA NATURAL OR ARTIFICIAL OPENING ENDOSCOPIC, DIAGNOSTIC: ICD-10-PCS | Performed by: INTERNAL MEDICINE

## 2022-10-31 PROCEDURE — 0DB98ZX EXCISION OF DUODENUM, VIA NATURAL OR ARTIFICIAL OPENING ENDOSCOPIC, DIAGNOSTIC: ICD-10-PCS | Performed by: INTERNAL MEDICINE

## 2022-10-31 RX ORDER — SODIUM CHLORIDE, SODIUM LACTATE, POTASSIUM CHLORIDE, CALCIUM CHLORIDE 600; 310; 30; 20 MG/100ML; MG/100ML; MG/100ML; MG/100ML
INJECTION, SOLUTION INTRAVENOUS CONTINUOUS
Status: DISCONTINUED | OUTPATIENT
Start: 2022-10-31 | End: 2022-10-31

## 2022-10-31 RX ORDER — LIDOCAINE HYDROCHLORIDE 10 MG/ML
INJECTION, SOLUTION EPIDURAL; INFILTRATION; INTRACAUDAL; PERINEURAL AS NEEDED
Status: DISCONTINUED | OUTPATIENT
Start: 2022-10-31 | End: 2022-10-31 | Stop reason: SURG

## 2022-10-31 RX ADMIN — LIDOCAINE HYDROCHLORIDE 50 MG: 10 INJECTION, SOLUTION EPIDURAL; INFILTRATION; INTRACAUDAL; PERINEURAL at 14:54:00

## 2022-10-31 RX ADMIN — SODIUM CHLORIDE, SODIUM LACTATE, POTASSIUM CHLORIDE, CALCIUM CHLORIDE: 600; 310; 30; 20 INJECTION, SOLUTION INTRAVENOUS at 14:54:00

## 2022-10-31 NOTE — DISCHARGE INSTRUCTIONS
Home Care Instructions for Colonoscopy with Sedation    Diet:  - Resume your regular diet as tolerated unless otherwise instructed. - Start with light meals to minimize bloating.  - Do not drink alcohol today. Medication:  - If you have questions about resuming your normal medications, please contact your Primary Care Physician. Activities:  - Take it easy today. Do not return to work today. - Do not drive today. - Do not operate any machinery today (including kitchen equipment). Colonoscopy:  - You may notice some rectal \"spotting\" (a little blood on the toilet tissue) for a day or two after the exam. This is normal.  - If you experience any rectal bleeding (not spotting), persistent tenderness or sharp severe abdominal pains, oral temperature over 100 degrees Fahrenheit, light-headedness or dizziness, or any other problems, contact your doctor. **If unable to reach your doctor, please go to the [facility] Emergency Room**    - Your referring physician will receive a full report of your examination.  - If you do not hear from your doctor's office within two weeks of your biopsy, please call them for your results. You may be able to see your laboratory results in [facility] between 4 and 7 business days. In some cases, your physician may not have viewed the results before they are released to [address] If you have questions regarding your results contact the physician who ordered the test/exam by phone or via [address] by choosing \"Ask a Medical Question. \" Home Care Instructions for Gastroscopy with Sedation    Diet:  - Resume your regular diet as tolerated unless otherwise instructed. - Start with light meals to minimize bloating.  - Do not drink alcohol today. Medication:  - If you have questions about resuming your normal medications, please contact your Primary Care Physician. Activities:  - Take it easy today. Do not return to work today. - Do not drive today.   - Do not operate any machinery today (including kitchen equipment). Gastroscopy:  - You may have a sore throat for 2-3 days following the exam. This is normal. Gargling with warm salt water (1/2 tsp salt to 1 glass warm water) or using throat lozenges will help. - If you experience any sharp pain in your neck, abdomen or chest, vomiting of blood, oral temperature over 100 degrees Fahrenheit, light-headedness or dizziness, or any other problems, contact your doctor. **If unable to reach your doctor, please go to the [facility] Emergency Room**    - Your referring physician will receive a full report of your examination.  - If you do not hear from your doctor's office within two weeks of your biopsy, please call them for your results. You may be able to see your laboratory results in [facility] between 4 and 7 business days. In some cases, your physician may not have viewed the results before they are released to [address] If you have questions regarding your results contact the physician who ordered the test/exam by phone or via [address] by choosing \"Ask a Medical Question. \"

## 2022-10-31 NOTE — OPERATIVE REPORT
[facility] Endoscopy Report      Date of Procedure:  10/31/22      Preoperative Diagnosis:  1. Iron deficiency  2. Anorexia and weight loss  3. Night sweats  4. Gastroesophageal reflux      Postoperative Diagnosis:  1. Colon polyp  2. Sigmoid colon diverticulosis  3. Internal hemorrhoids  4. Small hiatal hernia  5. Rule out short segment Logan's esophagus      Procedure:    Colonoscopy with polypectomy  Esophagogastroduodenoscopy with biopsy      Surgeon:  Bryson Panda M.D. Anesthesia:  Monitored anesthesia care  Cecal withdrawal time: 20 minutes  EBL:  Insignificant      Brief History: This is a 40year old male who has been followed to have iron deficiency (without anemia) in the setting of a chronic gastroesophageal reflux on a proton pump inhibitor. The patient had transient rectal bleeding and also has had significant anorexia, weight loss and night sweats. Laboratory testing is otherwise negative. A somatization disorder is suspected, however, endoscopic evaluation is being performed to exclude structural lesions. Technique:  After informed consent, the patient was placed in the left lateral recumbent position. Digital rectal examination revealed no palpable intraluminal abnormalities. An Olympus variable stiffness 190 series HD colonoscope was inserted into the rectum and advanced under direct vision by following the lumen to the terminal ileum. The colon was examined upon withdrawal in the left lateral recumbent position. Following colonoscopy, an Olympus adult HD gastroscope was inserted into the hypopharynx and advanced under direct vision into the esophagus, stomach and duodenum. The endoscope was withdrawn to the stomach where retroflexion of the angulus, body, cardia and fundus was performed. The instrument was straightened, insufflated air and fluid were suctioned and the endoscope was withdrawn.   The procedures were well tolerated without immediate complication. Findings:  The preparation of the colon was good. There was no blood within the colonic lumen. The terminal ileum was examined for at least 10 cm and visually normal.  The ileocecal valve was well preserved. The visualized colonic mucosa from the cecum to the anal verge was normal with an intact vascular pattern. In the proximal transverse colon there was a 7-8 mm serrated sessile polyp which was cold snare excised and retrieved. No ongoing bleeding. There were a few diverticula seen in the sigmoid colon without current signs of complication. There were no other colonic polyps, mass lesions, vascular anomalies or signs of inflammation seen. Retroflexion in the rectum revealed internal hemorrhoids with overlying red sravani markings. The esophagus in its proximal and mid portions was normal.  The Z-line was irregular and there were areas of columnar mucosa extending at least several millimeters into the distal esophagus. There were no visible epithelial abnormalities. Biopsies were obtained. The GE junction and diaphragmatic impression were at 40/42 cm with a 1-2 cm sliding hiatal hernia. The stomach distended appropriately with insufflated air. The mucosa of the stomach including cardia, fundus, gastric body and antrum was normal.  Biopsies from the antrum were obtained. The duodenal bulb and post bulbar regions were normal.  The villous pattern was normal.  Biopsies from the duodenum were obtained. Impression:  1. Colon polyp  2. Uncomplicated sigmoid colon diverticulosis  3. Internal hemorrhoids  4. Small hiatal hernia  5. Rule out short segment Logan's esophagus    Recommendations:  1. Follow-up biopsy results. 2.  Further recommendations pending biopsy.           Jc Acuña MD  10/31/2022

## 2022-10-31 NOTE — ANESTHESIA POSTPROCEDURE EVALUATION
Patient: Shirley Carrera    Procedure Summary     Date: 10/31/22 Room / Location: [address] [place] [address] ENDOSCOPY 05 / [facility] ENDOSCOPY    Anesthesia Start: 8423 Anesthesia Stop:     Procedures:       COLONOSCOPY      ESOPHAGOGASTRODUODENOSCOPY (EGD) Diagnosis:       Iron deficiency anemia, unspecified iron deficiency anemia type      (diverticulosis; colon polyp; internal hemorrhoids; hiatal hernia rule out short segment barretts)    Surgeons: Ernesto Ashford MD Anesthesiologist: Marc De Guzman MD    Anesthesia Type: MAC ASA Status: 2          Anesthesia Type: MAC    Vitals Value Taken Time   BP 91/46 10/31/22 1543   Temp  10/31/22 1544   Pulse 53 10/31/22 1543   Resp 20 10/31/22 1543   SpO2 95 % 10/31/22 1543       [facility] AN Post Evaluation:   Patient Evaluated in PACU  Patient Participation: complete - patient participated  Level of Consciousness: awake  Pain Score: 0  Pain Management: adequate  Airway Patency:patent  Dental exam unchanged from preop  Yes    Cardiovascular Status: acceptable  Respiratory Status: acceptable  Postoperative Hydration acceptable      Shamar Kinsey MD  10/31/2022 3:44 PM

## 2022-11-01 ENCOUNTER — TELEPHONE (OUTPATIENT)
Dept: INTERNAL MEDICINE CLINIC | Facility: CLINIC | Age: 37
End: 2022-11-01

## 2022-11-01 ENCOUNTER — TELEPHONE (OUTPATIENT)
Facility: CLINIC | Age: 37
End: 2022-11-01

## 2022-11-01 NOTE — TELEPHONE ENCOUNTER
Triage Support- Please assist, thank you! Received call from Daniel Moon, with [facility]. Phone [phone]  Ext [phone]    She received a Packet of Information by mail for this patient's:  CT Brain or Head   9/23/22 - Test Completed     [facility] needs - Office notes, test results and any other documentation, leading up to the ordering of this test.   Notes needs to pre-date the ordering of the test.   Need notes explaining reasons why this test needed to be completed. Fax any notes to:    Fax [phone]   Attn Appeals       DUE BY ---- End of day on Friday 11/4/22

## 2022-11-01 NOTE — TELEPHONE ENCOUNTER
Patient is calling to speak with provider about results from procedure. Patient is very anxious about results and would like to speak with the provider.

## 2022-11-01 NOTE — TELEPHONE ENCOUNTER
Good AFternoon Dr Albino Blackmon and staff,    I have been trying to review patients cases and it looks like patient had 4 different tests without obtaining prior auth from [facility] PPO. Orders were entered by PCP and also Neurology so there are some duplicate requests. MRI Brain   CPT 96461  DOS  9/24/22    MRA Carotids  CPT 74679   Denied  DOS 9/24/22    MRA Brain CPT 84743   denied  DOS  9/23/22    CT Brain  CPT 37524     DOS  9/28/19    I have reached out to my supervisor, Dariela Jansen for assistance in all these cases as well. Any cases that were worked by [facility] or [facility] and have been denied the PCP office would be responsible for filing the appeal and supporting all clinical documents needed.     Please advise    Lucretia Luna

## 2022-11-02 NOTE — TELEPHONE ENCOUNTER
I spoke to Blanche and to his wife. 24 minutes was spent discussing the results of the colonoscopy and upper endoscopy. The patient had an 8 mm sessile serrated adenoma removed (histologically 1 cm). We discussed that this is a precancerous but benign and completely removed polyp. We also discussed diverticulosis without diverticulitis. I would recommend a high-fiber diet. We discussed a surveillance colonoscopy in 3-5 years and have elected 3 years. We also discussed nondysplastic short segment Logan's esophagus. I have recommended continued PPI therapy. We discussed that ablation of nondysplastic Logan's is controversial, however, the patient is age 40. I have offered the patient the option of an opinion regarding RFA with Dr. Riaz Gamble. Otherwise I would recommend a surveillance EGD in 3 years. We discussed that the negative CT scan and negative endoscopies are both reassuring with regards to his symptoms. All questions were answered to the best of my ability. GI RNs: Please enter colonoscopy and EGD recalls for 3 years.   Dr. Ronaldo Vila: [facility]

## 2022-11-02 NOTE — TELEPHONE ENCOUNTER
Recall colon/egd in 3 years per Dr Tatiana Chappell.  Colon/EGD done 10/31/2022    Health maintenance updated    Specialty comments updated     Message sent to pt outreach to repeat Colon/EGD in 3 years

## 2022-11-03 ENCOUNTER — OFFICE VISIT (OUTPATIENT)
Dept: OPHTHALMOLOGY | Facility: CLINIC | Age: 37
End: 2022-11-03
Payer: COMMERCIAL

## 2022-11-03 DIAGNOSIS — H11.441 CONJUNCTIVAL CYST OF RIGHT EYE: Primary | ICD-10-CM

## 2022-11-03 DIAGNOSIS — H52.223 MYOPIA OF BOTH EYES WITH REGULAR ASTIGMATISM: ICD-10-CM

## 2022-11-03 DIAGNOSIS — H52.13 MYOPIA OF BOTH EYES WITH REGULAR ASTIGMATISM: ICD-10-CM

## 2022-11-03 PROCEDURE — 92004 COMPRE OPH EXAM NEW PT 1/>: CPT | Performed by: OPHTHALMOLOGY

## 2022-11-03 NOTE — PATIENT INSTRUCTIONS
Myopia of both eyes with regular astigmatism  Continue same glasses. He goes elsewhere for glasses and contacts. Conjunctival cyst of right eye  Resolved. No treatment. Discussed that by patient's description, it seems like he had a conjunctival cyst on the right eye that resolved. He denies any surgery or injury on that eye. Patient should call the office and schedule an office visit if symptoms reoccur.

## 2022-11-03 NOTE — ASSESSMENT & PLAN NOTE
Resolved. No treatment. Discussed that by patient's description, it seems like he had a conjunctival cyst on the right eye that resolved. He denies any surgery or injury on that eye. Patient should call the office and schedule an office visit if symptoms reoccur.

## 2022-11-07 NOTE — TELEPHONE ENCOUNTER
Good Morning Dr Iqra Flores and staff,        Ref# [id number] - CPT 05735 - MRA Brain  - Pt had test completed without auth from [facility] PPO  - case denied  - post service request can not be initiated for member per [facility]     Ref# [id number] - CPT 00635 - MRA Carotids  - case# [id number]  - case was submitted on 9/23   - case was denied on 9/26  - pt completed test on 9/24/22 without prior auth from [facility]    Ref# [id number]- CPT 49351 - MRI Brain  - entered by Neuro on 9/26/22  - closed by Vergie Gene as duplicate?  - case not worked    REf# [id number]- CPT 98953 - MRI Brain  - entered by Neuro on 9/26/22  - closed by Vergie Gene as duplicate?  - case not worked    Ref# [id number] - CPT 80174 - MRI Brain  - entered by Neuro on 9/29/22  - closed by Gail Momin  - case not worked    I will reach out again to my supervisor with assistance with the MRI Brain. I am unclear why Neuro office entered the imaging and then closed them without ever being worked.     Thank you    Matias Rivera

## 2022-11-07 NOTE — TELEPHONE ENCOUNTER
Good Afternoon Dr Greg Weber and staff,    1. CPT 77016 - MRI Brain    This test was performed and completed while in patient at [facility] on 9/30/22.        would not obtain a prior auth for imaging completed while patient was in ED or in patient at hospital.      2.  Appeal for CT Brain  - were all clinicals, imaging and notes submitted to [facility] for review   - appeal process is worked by PCP Office  - I will refax them all now since not documented if PCP office faxed info over to fax# [phone]     Thank you    Jania Sarkar

## 2022-11-07 NOTE — TELEPHONE ENCOUNTER
Alfonza Bumpers,   Just wanted to reach out to you to see if there were any updates regarding pts CT Brain/Head. Please advise.    Thanks,  Landon Gonzalez

## 2022-11-07 NOTE — TELEPHONE ENCOUNTER
No, I have not faxed documents as I was waiting to see if we needed to send or not. Thank you Angelika Hale for your f/u!

## 2022-11-15 ENCOUNTER — OFFICE VISIT (OUTPATIENT)
Dept: OTOLARYNGOLOGY | Facility: CLINIC | Age: 37
End: 2022-11-15
Payer: COMMERCIAL

## 2022-11-15 VITALS — TEMPERATURE: 97 F

## 2022-11-15 DIAGNOSIS — K21.00 GASTROESOPHAGEAL REFLUX DISEASE WITH ESOPHAGITIS, UNSPECIFIED WHETHER HEMORRHAGE: Primary | ICD-10-CM

## 2022-11-15 PROCEDURE — 31575 DIAGNOSTIC LARYNGOSCOPY: CPT | Performed by: STUDENT IN AN ORGANIZED HEALTH CARE EDUCATION/TRAINING PROGRAM

## 2022-11-15 PROCEDURE — 99213 OFFICE O/P EST LOW 20 MIN: CPT | Performed by: STUDENT IN AN ORGANIZED HEALTH CARE EDUCATION/TRAINING PROGRAM

## 2022-11-16 ENCOUNTER — TELEPHONE (OUTPATIENT)
Facility: CLINIC | Age: 37
End: 2022-11-16

## 2022-11-16 NOTE — TELEPHONE ENCOUNTER
Patient is requesting call back from nurse. Patient states he has a question regarding results for colonoscopy and endoscopy procedure that was done 10/31.

## 2022-11-18 NOTE — TELEPHONE ENCOUNTER
GI RNs: I spoke to the patient and his wife on the telephone about the results of his procedure on November 1st for almost 25 minutes. Can we find out what additional questions that he has?

## 2022-11-18 NOTE — TELEPHONE ENCOUNTER
Spoke to patient patient is requesting to know the length of Logan's esophagus. Patient was informed of short segment, but patient wanted specifics? Please advise. Thank you. Patient also requesting operative report to be mailed to him. Operative report mailed as requested.

## 2022-11-18 NOTE — TELEPHONE ENCOUNTER
Dr. Alena Caballero spoke to patient for over 15 min. Multiple questions answered and reassurance provided to patient. Patient is now inquiring why it was documented that he had an \"Abnormal CT scan of the lung\" under the anesthesia notes. Inquiring if there was anything seen with his lungs. Patient was informed per 10/24/22 CT abdomen+pelvis there was no visible pulmonary or pleural disease. Patient wanted to confirm with you. Please advise. Thank you.

## 2022-11-21 NOTE — TELEPHONE ENCOUNTER
I spoke to the pt and let him know that his CT scan was free of lung disease.  The pt would have to call Dr March Late office to have that removed from the problem list

## 2022-12-30 ENCOUNTER — OFFICE VISIT (OUTPATIENT)
Dept: INTERNAL MEDICINE CLINIC | Facility: CLINIC | Age: 37
End: 2022-12-30
Payer: COMMERCIAL

## 2022-12-30 VITALS
HEART RATE: 75 BPM | SYSTOLIC BLOOD PRESSURE: 124 MMHG | HEIGHT: 73 IN | TEMPERATURE: 98 F | BODY MASS INDEX: 25.31 KG/M2 | DIASTOLIC BLOOD PRESSURE: 80 MMHG | RESPIRATION RATE: 17 BRPM | WEIGHT: 191 LBS | OXYGEN SATURATION: 99 %

## 2022-12-30 DIAGNOSIS — K21.9 GASTROESOPHAGEAL REFLUX DISEASE, UNSPECIFIED WHETHER ESOPHAGITIS PRESENT: ICD-10-CM

## 2022-12-30 DIAGNOSIS — R73.09 ELEVATED GLUCOSE: ICD-10-CM

## 2022-12-30 DIAGNOSIS — F41.1 GENERALIZED ANXIETY DISORDER WITH PANIC ATTACKS: ICD-10-CM

## 2022-12-30 DIAGNOSIS — G47.00 INSOMNIA, UNSPECIFIED TYPE: ICD-10-CM

## 2022-12-30 DIAGNOSIS — R25.1 TREMORS OF NERVOUS SYSTEM: ICD-10-CM

## 2022-12-30 DIAGNOSIS — R68.2 DRY MOUTH: Primary | ICD-10-CM

## 2022-12-30 DIAGNOSIS — F41.0 GENERALIZED ANXIETY DISORDER WITH PANIC ATTACKS: ICD-10-CM

## 2022-12-30 LAB
ALBUMIN SERPL-MCNC: 3.7 G/DL (ref 3.4–5)
ALBUMIN/GLOB SERPL: 1 {RATIO} (ref 1–2)
ALP LIVER SERPL-CCNC: 89 U/L
ALT SERPL-CCNC: 39 U/L
ANION GAP SERPL CALC-SCNC: 4 MMOL/L (ref 0–18)
AST SERPL-CCNC: 15 U/L (ref 15–37)
BASOPHILS # BLD AUTO: 0.04 X10(3) UL (ref 0–0.2)
BASOPHILS NFR BLD AUTO: 0.6 %
BILIRUB SERPL-MCNC: 0.4 MG/DL (ref 0.1–2)
BUN BLD-MCNC: 11 MG/DL (ref 7–18)
BUN/CREAT SERPL: 9.6 (ref 10–20)
CALCIUM BLD-MCNC: 9.5 MG/DL (ref 8.5–10.1)
CHLORIDE SERPL-SCNC: 106 MMOL/L (ref 98–112)
CO2 SERPL-SCNC: 31 MMOL/L (ref 21–32)
CREAT BLD-MCNC: 1.15 MG/DL
DEPRECATED RDW RBC AUTO: 42.3 FL (ref 35.1–46.3)
EOSINOPHIL # BLD AUTO: 0.13 X10(3) UL (ref 0–0.7)
EOSINOPHIL NFR BLD AUTO: 1.9 %
ERYTHROCYTE [DISTWIDTH] IN BLOOD BY AUTOMATED COUNT: 13.2 % (ref 11–15)
EST. AVERAGE GLUCOSE BLD GHB EST-MCNC: 114 MG/DL (ref 68–126)
FASTING STATUS PATIENT QL REPORTED: YES
GFR SERPLBLD BASED ON 1.73 SQ M-ARVRAT: 84 ML/MIN/1.73M2 (ref 60–?)
GLOBULIN PLAS-MCNC: 3.8 G/DL (ref 2.8–4.4)
GLUCOSE BLD-MCNC: 93 MG/DL (ref 70–99)
HBA1C MFR BLD: 5.6 % (ref ?–5.7)
HCT VFR BLD AUTO: 50.8 %
HGB BLD-MCNC: 16.3 G/DL
IMM GRANULOCYTES # BLD AUTO: 0.03 X10(3) UL (ref 0–1)
IMM GRANULOCYTES NFR BLD: 0.4 %
LYMPHOCYTES # BLD AUTO: 1.12 X10(3) UL (ref 1–4)
LYMPHOCYTES NFR BLD AUTO: 16.5 %
MCH RBC QN AUTO: 27.7 PG (ref 26–34)
MCHC RBC AUTO-ENTMCNC: 32.1 G/DL (ref 31–37)
MCV RBC AUTO: 86.4 FL
MONOCYTES # BLD AUTO: 0.52 X10(3) UL (ref 0.1–1)
MONOCYTES NFR BLD AUTO: 7.7 %
NEUTROPHILS # BLD AUTO: 4.93 X10 (3) UL (ref 1.5–7.7)
NEUTROPHILS # BLD AUTO: 4.93 X10(3) UL (ref 1.5–7.7)
NEUTROPHILS NFR BLD AUTO: 72.9 %
OSMOLALITY SERPL CALC.SUM OF ELEC: 291 MOSM/KG (ref 275–295)
PLATELET # BLD AUTO: 243 10(3)UL (ref 150–450)
POTASSIUM SERPL-SCNC: 4.2 MMOL/L (ref 3.5–5.1)
PROT SERPL-MCNC: 7.5 G/DL (ref 6.4–8.2)
RBC # BLD AUTO: 5.88 X10(6)UL
SODIUM SERPL-SCNC: 141 MMOL/L (ref 136–145)
WBC # BLD AUTO: 6.8 X10(3) UL (ref 4–11)

## 2022-12-30 PROCEDURE — 3074F SYST BP LT 130 MM HG: CPT | Performed by: INTERNAL MEDICINE

## 2022-12-30 PROCEDURE — 3008F BODY MASS INDEX DOCD: CPT | Performed by: INTERNAL MEDICINE

## 2022-12-30 PROCEDURE — 3079F DIAST BP 80-89 MM HG: CPT | Performed by: INTERNAL MEDICINE

## 2022-12-30 PROCEDURE — 36415 COLL VENOUS BLD VENIPUNCTURE: CPT | Performed by: INTERNAL MEDICINE

## 2022-12-30 PROCEDURE — 99214 OFFICE O/P EST MOD 30 MIN: CPT | Performed by: INTERNAL MEDICINE

## 2023-01-06 ENCOUNTER — MED REC SCAN ONLY (OUTPATIENT)
Dept: INTERNAL MEDICINE CLINIC | Facility: CLINIC | Age: 38
End: 2023-01-06

## 2023-01-24 ENCOUNTER — PATIENT MESSAGE (OUTPATIENT)
Dept: SURGERY | Facility: CLINIC | Age: 38
End: 2023-01-24

## 2023-01-26 ENCOUNTER — OFFICE VISIT (OUTPATIENT)
Dept: SURGERY | Facility: CLINIC | Age: 38
End: 2023-01-26

## 2023-01-26 VITALS — SYSTOLIC BLOOD PRESSURE: 139 MMHG | TEMPERATURE: 98 F | HEART RATE: 83 BPM | DIASTOLIC BLOOD PRESSURE: 80 MMHG

## 2023-01-26 DIAGNOSIS — R30.0 DYSURIA: ICD-10-CM

## 2023-01-26 DIAGNOSIS — R10.32 LEFT LOWER QUADRANT ABDOMINAL PAIN: Primary | ICD-10-CM

## 2023-01-26 DIAGNOSIS — Q63.2 MALROTATION OF KIDNEY: ICD-10-CM

## 2023-01-26 LAB
APPEARANCE: CLEAR
BILIRUBIN: NEGATIVE
GLUCOSE (URINE DIPSTICK): NEGATIVE MG/DL
KETONES (URINE DIPSTICK): NEGATIVE MG/DL
LEUKOCYTES: NEGATIVE
MULTISTIX LOT#: NORMAL NUMERIC
NITRITE, URINE: NEGATIVE
OCCULT BLOOD: NEGATIVE
PH, URINE: 7 (ref 4.5–8)
PROTEIN (URINE DIPSTICK): NEGATIVE MG/DL
SPECIFIC GRAVITY: 1.02 (ref 1–1.03)
URINE-COLOR: YELLOW
UROBILINOGEN,SEMI-QN: 0.2 MG/DL (ref 0–1.9)

## 2023-01-26 PROCEDURE — 99213 OFFICE O/P EST LOW 20 MIN: CPT | Performed by: UROLOGY

## 2023-01-26 PROCEDURE — 81002 URINALYSIS NONAUTO W/O SCOPE: CPT | Performed by: UROLOGY

## 2023-01-26 PROCEDURE — 3075F SYST BP GE 130 - 139MM HG: CPT | Performed by: UROLOGY

## 2023-01-26 PROCEDURE — 3079F DIAST BP 80-89 MM HG: CPT | Performed by: UROLOGY

## 2023-03-06 ENCOUNTER — TELEPHONE (OUTPATIENT)
Dept: HEMATOLOGY/ONCOLOGY | Facility: HOSPITAL | Age: 38
End: 2023-03-06

## 2023-03-06 DIAGNOSIS — D75.1 POLYCYTHEMIA: Primary | ICD-10-CM

## 2023-03-06 DIAGNOSIS — D50.9 IRON DEFICIENCY ANEMIA, UNSPECIFIED IRON DEFICIENCY ANEMIA TYPE: ICD-10-CM

## 2023-03-06 NOTE — TELEPHONE ENCOUNTER
Writer called patient and follow up scheduled for 3/14. Patient will have labs completed beforehand.

## 2023-03-11 ENCOUNTER — LAB ENCOUNTER (OUTPATIENT)
Dept: LAB | Facility: HOSPITAL | Age: 38
End: 2023-03-11
Attending: STUDENT IN AN ORGANIZED HEALTH CARE EDUCATION/TRAINING PROGRAM
Payer: COMMERCIAL

## 2023-03-11 DIAGNOSIS — D75.1 POLYCYTHEMIA: ICD-10-CM

## 2023-03-11 DIAGNOSIS — D50.9 IRON DEFICIENCY ANEMIA, UNSPECIFIED IRON DEFICIENCY ANEMIA TYPE: ICD-10-CM

## 2023-03-11 LAB
BASOPHILS # BLD AUTO: 0.02 X10(3) UL (ref 0–0.2)
BASOPHILS NFR BLD AUTO: 0.6 %
DEPRECATED HBV CORE AB SER IA-ACNC: 92.5 NG/ML
DEPRECATED RDW RBC AUTO: 40.7 FL (ref 35.1–46.3)
EOSINOPHIL # BLD AUTO: 0.14 X10(3) UL (ref 0–0.7)
EOSINOPHIL NFR BLD AUTO: 4.3 %
ERYTHROCYTE [DISTWIDTH] IN BLOOD BY AUTOMATED COUNT: 13.2 % (ref 11–15)
HCT VFR BLD AUTO: 46.9 %
HGB BLD-MCNC: 15.4 G/DL
IMM GRANULOCYTES # BLD AUTO: 0.01 X10(3) UL (ref 0–1)
IMM GRANULOCYTES NFR BLD: 0.3 %
IRON SATN MFR SERPL: 8 %
IRON SERPL-MCNC: 34 UG/DL
LYMPHOCYTES # BLD AUTO: 0.79 X10(3) UL (ref 1–4)
LYMPHOCYTES NFR BLD AUTO: 24 %
MCH RBC QN AUTO: 27.7 PG (ref 26–34)
MCHC RBC AUTO-ENTMCNC: 32.8 G/DL (ref 31–37)
MCV RBC AUTO: 84.4 FL
MONOCYTES # BLD AUTO: 0.42 X10(3) UL (ref 0.1–1)
MONOCYTES NFR BLD AUTO: 12.8 %
NEUTROPHILS # BLD AUTO: 1.91 X10 (3) UL (ref 1.5–7.7)
NEUTROPHILS # BLD AUTO: 1.91 X10(3) UL (ref 1.5–7.7)
NEUTROPHILS NFR BLD AUTO: 58 %
PLATELET # BLD AUTO: 194 10(3)UL (ref 150–450)
RBC # BLD AUTO: 5.56 X10(6)UL
TIBC SERPL-MCNC: 422 UG/DL (ref 240–450)
TRANSFERRIN SERPL-MCNC: 283 MG/DL (ref 200–360)
WBC # BLD AUTO: 3.3 X10(3) UL (ref 4–11)

## 2023-03-11 PROCEDURE — 36415 COLL VENOUS BLD VENIPUNCTURE: CPT

## 2023-03-11 PROCEDURE — 83540 ASSAY OF IRON: CPT

## 2023-03-11 PROCEDURE — 85025 COMPLETE CBC W/AUTO DIFF WBC: CPT

## 2023-03-11 PROCEDURE — 82728 ASSAY OF FERRITIN: CPT

## 2023-03-11 PROCEDURE — 84466 ASSAY OF TRANSFERRIN: CPT

## 2023-03-14 ENCOUNTER — OFFICE VISIT (OUTPATIENT)
Dept: HEMATOLOGY/ONCOLOGY | Facility: HOSPITAL | Age: 38
End: 2023-03-14
Attending: STUDENT IN AN ORGANIZED HEALTH CARE EDUCATION/TRAINING PROGRAM
Payer: COMMERCIAL

## 2023-03-14 VITALS
BODY MASS INDEX: 26.9 KG/M2 | WEIGHT: 203 LBS | OXYGEN SATURATION: 97 % | HEIGHT: 73 IN | RESPIRATION RATE: 16 BRPM | DIASTOLIC BLOOD PRESSURE: 76 MMHG | SYSTOLIC BLOOD PRESSURE: 120 MMHG | TEMPERATURE: 98 F | HEART RATE: 71 BPM

## 2023-03-14 DIAGNOSIS — E61.1 IRON DEFICIENCY: Primary | ICD-10-CM

## 2023-03-14 DIAGNOSIS — K21.9 GASTROESOPHAGEAL REFLUX DISEASE, UNSPECIFIED WHETHER ESOPHAGITIS PRESENT: ICD-10-CM

## 2023-03-14 PROCEDURE — 99214 OFFICE O/P EST MOD 30 MIN: CPT | Performed by: STUDENT IN AN ORGANIZED HEALTH CARE EDUCATION/TRAINING PROGRAM

## 2023-03-28 ENCOUNTER — OFFICE VISIT (OUTPATIENT)
Dept: HEMATOLOGY/ONCOLOGY | Facility: HOSPITAL | Age: 38
End: 2023-03-28
Attending: STUDENT IN AN ORGANIZED HEALTH CARE EDUCATION/TRAINING PROGRAM
Payer: COMMERCIAL

## 2023-03-28 VITALS
TEMPERATURE: 98 F | HEART RATE: 61 BPM | SYSTOLIC BLOOD PRESSURE: 111 MMHG | OXYGEN SATURATION: 98 % | RESPIRATION RATE: 16 BRPM | DIASTOLIC BLOOD PRESSURE: 69 MMHG

## 2023-03-28 DIAGNOSIS — E61.1 IRON DEFICIENCY: Primary | ICD-10-CM

## 2023-03-28 PROCEDURE — 96374 THER/PROPH/DIAG INJ IV PUSH: CPT

## 2023-03-28 NOTE — PROGRESS NOTES
Patient arrives ambulating independently for x1 INJECTAFER infusion for iron deficiency. Only complaint upon arrival was SOB. Tolerated infusion well, 30 minute observation completed with no s/s of adverse reaction. PIV removed, 2x2 gauze and coban applied. Discharged home, stable with future appointments scheduled.

## 2023-04-25 DIAGNOSIS — E61.1 IRON DEFICIENCY: Primary | ICD-10-CM

## 2023-05-25 ENCOUNTER — LAB ENCOUNTER (OUTPATIENT)
Dept: LAB | Facility: HOSPITAL | Age: 38
End: 2023-05-25
Attending: STUDENT IN AN ORGANIZED HEALTH CARE EDUCATION/TRAINING PROGRAM
Payer: COMMERCIAL

## 2023-05-25 DIAGNOSIS — E61.1 IRON DEFICIENCY: ICD-10-CM

## 2023-05-25 LAB
ALBUMIN SERPL-MCNC: 3.9 G/DL (ref 3.4–5)
ALBUMIN/GLOB SERPL: 1.2 {RATIO} (ref 1–2)
ALP LIVER SERPL-CCNC: 70 U/L
ALT SERPL-CCNC: 22 U/L
ANION GAP SERPL CALC-SCNC: 2 MMOL/L (ref 0–18)
AST SERPL-CCNC: 15 U/L (ref 15–37)
BASOPHILS # BLD AUTO: 0.02 X10(3) UL (ref 0–0.2)
BASOPHILS NFR BLD AUTO: 0.4 %
BILIRUB SERPL-MCNC: 0.3 MG/DL (ref 0.1–2)
BUN BLD-MCNC: 11 MG/DL (ref 7–18)
BUN/CREAT SERPL: 9.5 (ref 10–20)
CALCIUM BLD-MCNC: 9.1 MG/DL (ref 8.5–10.1)
CHLORIDE SERPL-SCNC: 109 MMOL/L (ref 98–112)
CO2 SERPL-SCNC: 30 MMOL/L (ref 21–32)
CREAT BLD-MCNC: 1.16 MG/DL
DEPRECATED HBV CORE AB SER IA-ACNC: 106.4 NG/ML
DEPRECATED RDW RBC AUTO: 39.5 FL (ref 35.1–46.3)
EOSINOPHIL # BLD AUTO: 0.15 X10(3) UL (ref 0–0.7)
EOSINOPHIL NFR BLD AUTO: 3.3 %
ERYTHROCYTE [DISTWIDTH] IN BLOOD BY AUTOMATED COUNT: 12.7 % (ref 11–15)
FASTING STATUS PATIENT QL REPORTED: YES
GFR SERPLBLD BASED ON 1.73 SQ M-ARVRAT: 83 ML/MIN/1.73M2 (ref 60–?)
GLOBULIN PLAS-MCNC: 3.2 G/DL (ref 2.8–4.4)
GLUCOSE BLD-MCNC: 106 MG/DL (ref 70–99)
HCT VFR BLD AUTO: 51.4 %
HGB BLD-MCNC: 16.9 G/DL
IMM GRANULOCYTES # BLD AUTO: 0.01 X10(3) UL (ref 0–1)
IMM GRANULOCYTES NFR BLD: 0.2 %
IRON SATN MFR SERPL: 18 %
IRON SERPL-MCNC: 73 UG/DL
LYMPHOCYTES # BLD AUTO: 0.94 X10(3) UL (ref 1–4)
LYMPHOCYTES NFR BLD AUTO: 21 %
MCH RBC QN AUTO: 27.9 PG (ref 26–34)
MCHC RBC AUTO-ENTMCNC: 32.9 G/DL (ref 31–37)
MCV RBC AUTO: 85 FL
MONOCYTES # BLD AUTO: 0.35 X10(3) UL (ref 0.1–1)
MONOCYTES NFR BLD AUTO: 7.8 %
NEUTROPHILS # BLD AUTO: 3.01 X10 (3) UL (ref 1.5–7.7)
NEUTROPHILS # BLD AUTO: 3.01 X10(3) UL (ref 1.5–7.7)
NEUTROPHILS NFR BLD AUTO: 67.3 %
OSMOLALITY SERPL CALC.SUM OF ELEC: 292 MOSM/KG (ref 275–295)
PLATELET # BLD AUTO: 209 10(3)UL (ref 150–450)
POTASSIUM SERPL-SCNC: 4.2 MMOL/L (ref 3.5–5.1)
PROT SERPL-MCNC: 7.1 G/DL (ref 6.4–8.2)
RBC # BLD AUTO: 6.05 X10(6)UL
SODIUM SERPL-SCNC: 141 MMOL/L (ref 136–145)
TIBC SERPL-MCNC: 402 UG/DL (ref 240–450)
TRANSFERRIN SERPL-MCNC: 270 MG/DL (ref 200–360)
WBC # BLD AUTO: 4.5 X10(3) UL (ref 4–11)

## 2023-05-25 PROCEDURE — 83540 ASSAY OF IRON: CPT

## 2023-05-25 PROCEDURE — 80053 COMPREHEN METABOLIC PANEL: CPT

## 2023-05-25 PROCEDURE — 82728 ASSAY OF FERRITIN: CPT

## 2023-05-25 PROCEDURE — 84466 ASSAY OF TRANSFERRIN: CPT

## 2023-05-25 PROCEDURE — 36415 COLL VENOUS BLD VENIPUNCTURE: CPT

## 2023-05-25 PROCEDURE — 85025 COMPLETE CBC W/AUTO DIFF WBC: CPT

## 2023-06-14 ENCOUNTER — TELEPHONE (OUTPATIENT)
Dept: HEMATOLOGY/ONCOLOGY | Facility: HOSPITAL | Age: 38
End: 2023-06-14

## 2023-06-14 DIAGNOSIS — E61.1 IRON DEFICIENCY: Primary | ICD-10-CM

## 2023-06-14 NOTE — TELEPHONE ENCOUNTER
Writer left message for patient, in forming him that per Dr. Edgar New his most recent labs look ok. Iron remains replete. Informed him that repeat labs and follow up in 6 months needed. Number for clinic provided and orders for labs placed to be drawn in late November/early December.

## 2023-07-09 ENCOUNTER — HOSPITAL ENCOUNTER (OUTPATIENT)
Dept: GENERAL RADIOLOGY | Age: 38
End: 2023-07-09
Attending: NURSE PRACTITIONER
Payer: COMMERCIAL

## 2023-07-09 ENCOUNTER — OFFICE VISIT (OUTPATIENT)
Dept: FAMILY MEDICINE CLINIC | Facility: CLINIC | Age: 38
End: 2023-07-09
Payer: COMMERCIAL

## 2023-07-09 ENCOUNTER — HOSPITAL ENCOUNTER (OUTPATIENT)
Dept: GENERAL RADIOLOGY | Age: 38
Discharge: HOME | End: 2023-07-09
Attending: NURSE PRACTITIONER
Payer: COMMERCIAL

## 2023-07-09 VITALS
OXYGEN SATURATION: 96 % | TEMPERATURE: 98 F | RESPIRATION RATE: 20 BRPM | HEIGHT: 73 IN | WEIGHT: 191.81 LBS | DIASTOLIC BLOOD PRESSURE: 84 MMHG | BODY MASS INDEX: 25.42 KG/M2 | HEART RATE: 82 BPM | SYSTOLIC BLOOD PRESSURE: 126 MMHG

## 2023-07-09 DIAGNOSIS — R05.1 ACUTE COUGH: ICD-10-CM

## 2023-07-09 DIAGNOSIS — R06.2 WHEEZING: ICD-10-CM

## 2023-07-09 DIAGNOSIS — J01.90 ACUTE SINUSITIS, RECURRENCE NOT SPECIFIED, UNSPECIFIED LOCATION: Primary | ICD-10-CM

## 2023-07-09 PROCEDURE — 71046 X-RAY EXAM CHEST 2 VIEWS: CPT | Performed by: NURSE PRACTITIONER

## 2023-07-09 PROCEDURE — 99213 OFFICE O/P EST LOW 20 MIN: CPT | Performed by: NURSE PRACTITIONER

## 2023-07-09 PROCEDURE — 3074F SYST BP LT 130 MM HG: CPT | Performed by: NURSE PRACTITIONER

## 2023-07-09 PROCEDURE — 3079F DIAST BP 80-89 MM HG: CPT | Performed by: NURSE PRACTITIONER

## 2023-07-09 PROCEDURE — 3008F BODY MASS INDEX DOCD: CPT | Performed by: NURSE PRACTITIONER

## 2023-07-09 RX ORDER — DOXYCYCLINE HYCLATE 100 MG/1
100 CAPSULE ORAL 2 TIMES DAILY
Qty: 14 CAPSULE | Refills: 0 | Status: SHIPPED | OUTPATIENT
Start: 2023-07-09 | End: 2023-07-16

## 2023-07-09 RX ORDER — ALBUTEROL SULFATE 90 UG/1
1-2 AEROSOL, METERED RESPIRATORY (INHALATION)
Qty: 8 G | Refills: 0 | Status: SHIPPED | OUTPATIENT
Start: 2023-07-09

## 2023-07-09 NOTE — PATIENT INSTRUCTIONS
Go to the Immediate Care center in [address] [place] [address] for your chest x-ray. I will notify you of results.   Use Albuterol inhaler every 4-6 hours for wheezing and chest tightness  START Claritin or Zyrtec daily to help with post nasal drip/throat irritation - take for 1-2 weeks  Mucinex DM OTC for cough/congestion - take as directed  Push fluids, rest  Return to care for new/worsening symptoms

## 2023-07-21 ENCOUNTER — OFFICE VISIT (OUTPATIENT)
Dept: OTOLARYNGOLOGY | Facility: CLINIC | Age: 38
End: 2023-07-21

## 2023-07-21 DIAGNOSIS — R06.2 WHEEZING: ICD-10-CM

## 2023-07-21 DIAGNOSIS — R09.82 PND (POST-NASAL DRIP): Primary | ICD-10-CM

## 2023-07-21 DIAGNOSIS — R49.0 HOARSENESS OF VOICE: ICD-10-CM

## 2023-07-21 RX ORDER — MONTELUKAST SODIUM 10 MG/1
10 TABLET ORAL NIGHTLY
Qty: 30 TABLET | Refills: 3 | Status: SHIPPED | OUTPATIENT
Start: 2023-07-21

## 2023-07-21 RX ORDER — AZELASTINE 1 MG/ML
2 SPRAY, METERED NASAL 2 TIMES DAILY
Qty: 30 ML | Refills: 0 | Status: SHIPPED | OUTPATIENT
Start: 2023-07-21

## 2023-07-21 RX ORDER — METHYLPREDNISOLONE 4 MG/1
TABLET ORAL
Qty: 21 TABLET | Refills: 0 | Status: SHIPPED | OUTPATIENT
Start: 2023-07-21

## 2023-07-21 NOTE — PROGRESS NOTES
Shayne Jones is a 45year old male. Patient presents with:  Throat Problem: Pt reports to have throat irritation and wheezing when breathing out. X 1 month. ASSESSMENT AND PLAN:   1. PND (post-nasal drip)      2. Hoarseness of voice  45year-old presents with somewhat recent onset of expiratory wheezing when he breathes out mostly when he exerts himself such as lifting a child or physical activity. Also notes chronic throat irritation and even some hoarseness of voice. This has been a longstanding issue for over a year. He is taking a PPI for GERD was trialed on a course of doxycycline without much benefit. Is a non-smoker. On exam he does have moderate posterior pharyngeal erythema as well as of his uvula and soft palate. His voice is mildly raspy. Flexible laryngoscopy his vocal cords were mobile bilaterally there were diffuse inflammatory changes of his posterior pharynx including cobblestoning and erythema and also some erythema of his larynx. This is likely indicative of postnasal drip or allergies causing this constant irritation of his larynx possibly leading to some edema causing the wheezing sound when he expires. Explained to these things with him in detail. We will trial on a course of Astelin, Claritin-D and Singulair to control this postnasal drip as well as a short course of steroids. Discussed if not better may refer to pulmonology for pulmonary function testing. May also consider Robinul if not improved for mucus and drainage control. 3. Wheezing        The patient indicates understanding of these issues and agrees to the plan. EXAM:   There were no vitals taken for this visit.     Pertinent exam findings may also be noted above in assessment and plan     System Details   Skin Inspection - Normal.   Constitutional Overall appearance - Normal.   Head/Face Symmetric, TMJ tenderness not present    Eyes EOMI, PERRL   Right ear:  Canal clear, TM intact, no FELIX   Left ear: Canal clear, TM intact, no FELIX   Nose: Septum midline, inferior turbinates not enlarged, nasal valves without collapse    Oral cavity/Oropharynx: No lesions or masses on inspection or palpation, tonsils symmetric    Neck: Soft without LAD, thyroid not enlarged  Voice clear/ no stridor   Other:      Scopes and Procedures:       Flexible Laryngoscopy Procedure Note (65820)    Due to inability for adequate examination of the larynx and need for magnification to perform the examination, endoscopy was performed. Risks and benefits were discussed with patient/family and they have given verbal consent to proceed. Pre-operative Diagnosis: PND (post-nasal drip)  (primary encounter diagnosis)  Hoarseness of voice  Wheezing    Post-operative Diagnosis: Same    Procedure: Diagnostic flexible fiberoptic laryngoscopy    Anesthesia: Topical anesthetic [place]     Surgeon Moses King MD    EBL: 0cc    Procedure Detail & Findings:     After placement of topical anesthetic intranasally the flexible laryngoscope was inserted into the nare and driven through the nasal cavity with no significant abnormal findings noted in the nasal cavities or nasopharynx. The oropharynx, hypopharynx and larynx were subsequently examined and the following findings were noted:     there were diffuse inflammatory changes of his posterior pharynx including cobblestoning        Base of Tongue: Normal        Valeculla: Normal        Arytenoids: Normal/Erythemous: Erythmous        Introitus of the esophagus: Normal        Epiglottis: Normal        False vocal cords: Normal        True vocal cords: Normal        Subglottic space: Normal        Mobility of True vocal cords: Normal    Condition: Stable    Complications: Patient tolerated the procedure well with no immediate complication. Fabricio Clark MD        REVIEW OF SYSTEMS:   GENERAL HEALTH: feels well otherwise  GENERAL : denies fever, chills, sweats, weight loss, weight gain  SKIN: denies any unusual skin lesions or rashes  RESPIRATORY: denies shortness of breath with exertion  NEURO: denies headaches    Current Outpatient Medications   Medication Sig Dispense Refill    loratadine-pseudoephedrine ER  MG Oral Tablet 24 Hr Take 1 tablet by mouth at bedtime. 30 tablet 1    azelastine 0.1 % Nasal Solution 2 sprays by Nasal route 2 (two) times daily. 30 mL 0    montelukast 10 MG Oral Tab Take 1 tablet (10 mg total) by mouth nightly. 30 tablet 3    methylPREDNISolone 4 MG Oral Tablet Therapy Pack Take by oral route. 21 tablet 0    albuterol 108 (90 Base) MCG/ACT Inhalation Aero Soln Inhale 1-2 puffs into the lungs every 4 to 6 hours as needed for Wheezing. 8 g 0    Omeprazole 40 MG Oral Capsule Delayed Release Take 1 capsule (40 mg total) by mouth daily. Before a meal 90 capsule 4    Multiple Vitamins-Minerals (MENS MULTI VITAMIN & MINERAL) Oral Tab Take 1 tablet by mouth daily.         Past Medical History:   Diagnosis Date    Anxiety     Conjunctival cyst of right eye 11/3/2022    Depression     Esophageal reflux       Social History:  Social History     Socioeconomic History    Marital status:    Tobacco Use    Smoking status: Never    Smokeless tobacco: Never   Vaping Use    Vaping Use: Never used   Substance and Sexual Activity    Alcohol use: Not Currently     Comment: socially    Drug use: Yes     Types: Cannabis   Other Topics Concern    Pt has a pacemaker No    Pt has a defibrillator No    Reaction to local anesthetic No          Jaydon Pastor MD  7/21/2023  2:43 PM

## 2024-06-04 ENCOUNTER — LAB ENCOUNTER (OUTPATIENT)
Dept: LAB | Facility: HOSPITAL | Age: 39
End: 2024-06-04
Attending: STUDENT IN AN ORGANIZED HEALTH CARE EDUCATION/TRAINING PROGRAM
Payer: COMMERCIAL

## 2024-06-04 DIAGNOSIS — E61.1 IRON DEFICIENCY: ICD-10-CM

## 2024-06-04 LAB
ALBUMIN SERPL-MCNC: 4.4 G/DL (ref 3.2–4.8)
ALBUMIN/GLOB SERPL: 1.6 {RATIO} (ref 1–2)
ALP LIVER SERPL-CCNC: 60 U/L
ALT SERPL-CCNC: 16 U/L
ANION GAP SERPL CALC-SCNC: 6 MMOL/L (ref 0–18)
AST SERPL-CCNC: 15 U/L (ref ?–34)
BASOPHILS # BLD AUTO: 0.04 X10(3) UL (ref 0–0.2)
BASOPHILS NFR BLD AUTO: 0.8 %
BILIRUB SERPL-MCNC: 0.5 MG/DL (ref 0.3–1.2)
BUN BLD-MCNC: 8 MG/DL (ref 9–23)
BUN/CREAT SERPL: 7.7 (ref 10–20)
CALCIUM BLD-MCNC: 9.5 MG/DL (ref 8.7–10.4)
CHLORIDE SERPL-SCNC: 109 MMOL/L (ref 98–112)
CO2 SERPL-SCNC: 28 MMOL/L (ref 21–32)
CREAT BLD-MCNC: 1.04 MG/DL
DEPRECATED HBV CORE AB SER IA-ACNC: 105.3 NG/ML
DEPRECATED RDW RBC AUTO: 37.7 FL (ref 35.1–46.3)
EGFRCR SERPLBLD CKD-EPI 2021: 94 ML/MIN/1.73M2 (ref 60–?)
EOSINOPHIL # BLD AUTO: 0.15 X10(3) UL (ref 0–0.7)
EOSINOPHIL NFR BLD AUTO: 3 %
ERYTHROCYTE [DISTWIDTH] IN BLOOD BY AUTOMATED COUNT: 12.3 % (ref 11–15)
FASTING STATUS PATIENT QL REPORTED: YES
GLOBULIN PLAS-MCNC: 2.7 G/DL (ref 2–3.5)
GLUCOSE BLD-MCNC: 97 MG/DL (ref 70–99)
HCT VFR BLD AUTO: 45.2 %
HGB BLD-MCNC: 15.1 G/DL
IMM GRANULOCYTES # BLD AUTO: 0.02 X10(3) UL (ref 0–1)
IMM GRANULOCYTES NFR BLD: 0.4 %
IRON SATN MFR SERPL: 26 %
IRON SERPL-MCNC: 72 UG/DL
LYMPHOCYTES # BLD AUTO: 0.99 X10(3) UL (ref 1–4)
LYMPHOCYTES NFR BLD AUTO: 19.8 %
MCH RBC QN AUTO: 28.2 PG (ref 26–34)
MCHC RBC AUTO-ENTMCNC: 33.4 G/DL (ref 31–37)
MCV RBC AUTO: 84.3 FL
MONOCYTES # BLD AUTO: 0.4 X10(3) UL (ref 0.1–1)
MONOCYTES NFR BLD AUTO: 8 %
NEUTROPHILS # BLD AUTO: 3.4 X10 (3) UL (ref 1.5–7.7)
NEUTROPHILS # BLD AUTO: 3.4 X10(3) UL (ref 1.5–7.7)
NEUTROPHILS NFR BLD AUTO: 68 %
OSMOLALITY SERPL CALC.SUM OF ELEC: 294 MOSM/KG (ref 275–295)
PLATELET # BLD AUTO: 237 10(3)UL (ref 150–450)
POTASSIUM SERPL-SCNC: 4 MMOL/L (ref 3.5–5.1)
PROT SERPL-MCNC: 7.1 G/DL (ref 5.7–8.2)
RBC # BLD AUTO: 5.36 X10(6)UL
SODIUM SERPL-SCNC: 143 MMOL/L (ref 136–145)
TIBC SERPL-MCNC: 273 UG/DL (ref 250–425)
TRANSFERRIN SERPL-MCNC: 183 MG/DL (ref 215–365)
WBC # BLD AUTO: 5 X10(3) UL (ref 4–11)

## 2024-06-04 PROCEDURE — 85025 COMPLETE CBC W/AUTO DIFF WBC: CPT

## 2024-06-04 PROCEDURE — 36415 COLL VENOUS BLD VENIPUNCTURE: CPT

## 2024-06-04 PROCEDURE — 83540 ASSAY OF IRON: CPT

## 2024-06-04 PROCEDURE — 84466 ASSAY OF TRANSFERRIN: CPT

## 2024-06-04 PROCEDURE — 80053 COMPREHEN METABOLIC PANEL: CPT

## 2024-06-04 PROCEDURE — 82728 ASSAY OF FERRITIN: CPT

## 2025-03-27 ENCOUNTER — OFFICE VISIT (OUTPATIENT)
Dept: INTERNAL MEDICINE CLINIC | Facility: CLINIC | Age: 40
End: 2025-03-27

## 2025-03-27 VITALS
HEIGHT: 73 IN | SYSTOLIC BLOOD PRESSURE: 128 MMHG | TEMPERATURE: 98 F | OXYGEN SATURATION: 99 % | BODY MASS INDEX: 25.18 KG/M2 | WEIGHT: 190 LBS | HEART RATE: 83 BPM | RESPIRATION RATE: 18 BRPM | DIASTOLIC BLOOD PRESSURE: 80 MMHG

## 2025-03-27 DIAGNOSIS — R19.09 LUMP IN THE GROIN: Primary | ICD-10-CM

## 2025-03-27 PROCEDURE — 99214 OFFICE O/P EST MOD 30 MIN: CPT | Performed by: INTERNAL MEDICINE

## 2025-03-27 PROCEDURE — 3008F BODY MASS INDEX DOCD: CPT | Performed by: INTERNAL MEDICINE

## 2025-03-27 PROCEDURE — 3079F DIAST BP 80-89 MM HG: CPT | Performed by: INTERNAL MEDICINE

## 2025-03-27 PROCEDURE — 3074F SYST BP LT 130 MM HG: CPT | Performed by: INTERNAL MEDICINE

## 2025-03-27 RX ORDER — SULFAMETHOXAZOLE AND TRIMETHOPRIM 800; 160 MG/1; MG/1
1 TABLET ORAL 2 TIMES DAILY
Qty: 14 TABLET | Refills: 0 | Status: SHIPPED | OUTPATIENT
Start: 2025-03-27 | End: 2025-04-03

## 2025-03-27 NOTE — PROGRESS NOTES
Subjective:   Patient ID: Harpreet Wiley is a 39 year old male.    Patient comes in with complaint of having a lump on the left groin has been there for few days some discomfort no discharge denies any urinary symptoms.  Otherwise doing okay        History/Other:   Review of Systems   Constitutional: Negative.  Negative for fatigue and fever.   HENT: Negative.  Negative for congestion.    Eyes: Negative.    Respiratory: Negative.  Negative for cough, shortness of breath and wheezing.    Cardiovascular: Negative.  Negative for chest pain, palpitations and leg swelling.   Gastrointestinal: Negative.    Endocrine: Negative for cold intolerance and heat intolerance.   Genitourinary: Negative.  Negative for dysuria, flank pain and hematuria.        Left groin lump   Musculoskeletal: Negative.  Negative for arthralgias, back pain and myalgias.   Skin: Negative.    Neurological: Negative.  Negative for dizziness, tremors, syncope, weakness and headaches.   Psychiatric/Behavioral: Negative.  Negative for agitation, behavioral problems and suicidal ideas. The patient is not nervous/anxious.      Current Outpatient Medications   Medication Sig Dispense Refill    sulfamethoxazole-trimethoprim -160 MG Oral Tab per tablet Take 1 tablet by mouth 2 (two) times daily for 7 days. 14 tablet 0    Omeprazole 40 MG Oral Capsule Delayed Release Take 1 capsule (40 mg total) by mouth daily. Before a meal 90 capsule 4    Multiple Vitamins-Minerals (MENS MULTI VITAMIN & MINERAL) Oral Tab Take 1 tablet by mouth daily.      loratadine-pseudoephedrine ER  MG Oral Tablet 24 Hr Take 1 tablet by mouth at bedtime. (Patient not taking: Reported on 3/27/2025) 30 tablet 1    azelastine 0.1 % Nasal Solution 2 sprays by Nasal route 2 (two) times daily. (Patient not taking: Reported on 3/27/2025) 30 mL 0    montelukast 10 MG Oral Tab Take 1 tablet (10 mg total) by mouth nightly. (Patient not taking: Reported on 3/27/2025) 30 tablet 3     methylPREDNISolone 4 MG Oral Tablet Therapy Pack Take by oral route. (Patient not taking: Reported on 3/27/2025) 21 tablet 0    albuterol 108 (90 Base) MCG/ACT Inhalation Aero Soln Inhale 1-2 puffs into the lungs every 4 to 6 hours as needed for Wheezing. (Patient not taking: Reported on 3/27/2025) 8 g 0     Allergies:Allergies[1]    Objective:   Physical Exam  Vitals and nursing note reviewed.   Constitutional:       Appearance: He is well-developed.   HENT:      Head: Normocephalic and atraumatic.      Right Ear: External ear normal.      Left Ear: External ear normal.      Nose: Nose normal.   Eyes:      Conjunctiva/sclera: Conjunctivae normal.      Pupils: Pupils are equal, round, and reactive to light.   Cardiovascular:      Rate and Rhythm: Normal rate and regular rhythm.      Heart sounds: Normal heart sounds.   Pulmonary:      Effort: Pulmonary effort is normal.      Breath sounds: Normal breath sounds.   Abdominal:      General: Bowel sounds are normal.      Palpations: Abdomen is soft.   Genitourinary:     Penis: Normal.       Prostate: Normal.      Rectum: Normal.   Musculoskeletal:         General: Normal range of motion.      Cervical back: Normal range of motion and neck supple.      Comments: Left groin lump 0.5 \" mild tender    Skin:     General: Skin is warm and dry.   Neurological:      Mental Status: He is alert and oriented to person, place, and time.      Deep Tendon Reflexes: Reflexes are normal and symmetric.         Assessment & Plan:   1. Lump in the groin will treat with antibiotic also will order Nutracel will follow results and if symptoms are not better to let us know       No orders of the defined types were placed in this encounter.      Meds This Visit:  Requested Prescriptions     Signed Prescriptions Disp Refills    sulfamethoxazole-trimethoprim -160 MG Oral Tab per tablet 14 tablet 0     Sig: Take 1 tablet by mouth 2 (two) times daily for 7 days.       Imaging &  Referrals:  US GROIN LEFT LIMITED (CPT=76882)         [1]   Allergies  Allergen Reactions    Ceclor NAUSEA AND VOMITING, SWELLING and PAIN    Cefaclor RASH

## 2025-05-19 ENCOUNTER — HOSPITAL ENCOUNTER (OUTPATIENT)
Dept: ULTRASOUND IMAGING | Facility: HOSPITAL | Age: 40
Discharge: HOME | End: 2025-05-19
Attending: INTERNAL MEDICINE
Payer: COMMERCIAL

## 2025-05-19 DIAGNOSIS — R19.09 LUMP IN THE GROIN: ICD-10-CM

## 2025-05-19 PROCEDURE — 76882 US LMTD JT/FCL EVL NVASC XTR: CPT | Performed by: INTERNAL MEDICINE

## 2025-07-14 ENCOUNTER — TELEPHONE (OUTPATIENT)
Facility: CLINIC | Age: 40
End: 2025-07-14

## 2025-07-18 ENCOUNTER — OFFICE VISIT (OUTPATIENT)
Dept: FAMILY MEDICINE CLINIC | Facility: CLINIC | Age: 40
End: 2025-07-18
Payer: COMMERCIAL

## 2025-07-18 VITALS
HEART RATE: 99 BPM | WEIGHT: 190 LBS | OXYGEN SATURATION: 97 % | DIASTOLIC BLOOD PRESSURE: 84 MMHG | RESPIRATION RATE: 18 BRPM | HEIGHT: 73 IN | SYSTOLIC BLOOD PRESSURE: 146 MMHG | BODY MASS INDEX: 25.18 KG/M2 | TEMPERATURE: 98 F

## 2025-07-18 DIAGNOSIS — L42 PITYRIASIS ROSEA: Primary | ICD-10-CM

## 2025-07-18 PROCEDURE — 3079F DIAST BP 80-89 MM HG: CPT | Performed by: FAMILY MEDICINE

## 2025-07-18 PROCEDURE — 3008F BODY MASS INDEX DOCD: CPT | Performed by: FAMILY MEDICINE

## 2025-07-18 PROCEDURE — 3077F SYST BP >= 140 MM HG: CPT | Performed by: FAMILY MEDICINE

## 2025-07-18 PROCEDURE — 99212 OFFICE O/P EST SF 10 MIN: CPT | Performed by: FAMILY MEDICINE

## 2025-07-19 NOTE — PROGRESS NOTES
CHIEF COMPLAINT:     Chief Complaint   Patient presents with    Rash     Sx Monday - Started using new organic laundry detergent  Sx Wednesday - Hives all over body  Sx yesterday - Started noticing more spots  Denies itchiness, pain, difficulty swallowing/breathing, n/v/d, fever, chills, body aches  OTC Claritin w/o relief          HPI:    Harpreet Wiley is a 40 year old male who presents for evaluation of a rash that presented 5 days ago.  Rash has been worsening gradually since onset.  Patient has not had similar rash in the past. The rash is characterized by red patches on ant/post torso, upper thighs.  Patient has treated rash with claritin which has not helped.  Associated symptoms include: none-- pt denies itching or pain  Exposure: none known, though pt does note his wife started using new laundry detergent earlier in the week.    Pertinent negatives include no anorexia, congestion, cough, diarrhea, eye pain, facial edema, fatigue, fever, joint pain, rhinorrhea, shortness of breath, sore throat or vomiting.      Current Outpatient Medications   Medication Sig Dispense Refill    Omeprazole 40 MG Oral Capsule Delayed Release Take 1 capsule (40 mg total) by mouth daily. Before a meal 90 capsule 4    Multiple Vitamins-Minerals (MENS MULTI VITAMIN & MINERAL) Oral Tab Take 1 tablet by mouth daily.       No current facility-administered medications for this visit.      Past Medical History[1]   Past Surgical History[2]   Family History[3]   Short Social Hx on File[4]      REVIEW OF SYSTEMS:   GENERAL: feels well otherwise, no fever, no chills.  SKIN: Per HPI. No edema. No ulcerations.  HEENT: Denies rhinorrhea, edema of the lips or swelling of throat.  CARDIOVASCULAR: Denies chest pains or palpitations.  LUNGS: Denies shortness of breath with exertion or rest. No cough or wheezing.  LYMPH: Denies enlargement of the lymph nodes.  NEURO: Denies abnormal sensation, tingling of the skin, or numbness.      EXAM:   BP  146/84   Pulse 99   Temp 98.4 °F (36.9 °C) (Tympanic)   Resp 18   Ht 6' 1\" (1.854 m)   Wt 190 lb (86.2 kg)   SpO2 97%   BMI 25.07 kg/m²   GENERAL: well developed, well nourished,in no apparent distress  SKIN: scattered erythematous maculopapular rash on abd, chest, and back with sparse involvement of the upper arms and upper thighs. \"Bartolo tree\" pattern is visible.   EYES: PERRLA, EOMI, conjunctivae are clear  HENT: Head atraumatic, normocephalic. TM's WNL bilaterally. Normal external nose. Nasal mucosa pink without edema. No erythema of the throat. Oropharynx moist without lesions.  LUNGS: Clear to auscultation bilaterally.  No wheezing, rhonchi, or rales.  No diminished breath sounds. No increased work of breathing.   CARDIO: RRR without murmur  LYMPH: No lymphadenopathy.     ASSESSMENT AND PLAN:   Harpreet Wiley is a 40 year old male who presents for evaluation of a rash. Findings are consistent with:    ASSESSMENT:  Encounter Diagnosis   Name Primary?    Pityriasis rosea Yes       PLAN: Meds as listed below.  Comfort measures as described in Patient Instructions.  Skin care discussed with patient.     Meds & Refills for this Visit:  Requested Prescriptions      No prescriptions requested or ordered in this encounter         Risk and benefits of medication discussed.   Patient Instructions   Monitor symptoms  You may take antihistamines for itching if needed.   If new symptoms develop or existing symptoms become severe, follow-up with your PCP or go to the ER for urgent evaluation.     The patient indicates understanding of these issues and agrees to the plan.       [1]   Past Medical History:   Anxiety    Conjunctival cyst of right eye    Depression    Esophageal reflux   [2]   Past Surgical History:  Procedure Laterality Date    Colonoscopy N/A 10/31/2022    Procedure: COLONOSCOPY;  Surgeon: Ari Hudson MD;  [facility] END[facility]COPY    Egd  03/2015    hiatal hernia @ [facility]     Hernia surgery     [3]   Family History  Problem Relation Age of Onset    Diabetes Mother     Diabetes Maternal Grandmother     Heart Disorder Maternal Grandmother     Glaucoma Neg     Macular degeneration Neg    [4]   Social History  Socioeconomic History    Marital status:    Tobacco Use    Smoking status: Never     Passive exposure: Never    Smokeless tobacco: Never   Vaping Use    Vaping status: Never Used   Substance and Sexual Activity    Alcohol use: Not Currently     Comment: socially    Drug use: Yes     Types: Cannabis   Other Topics Concern    Pt has a pacemaker No    Pt has a defibrillator No    Reaction to local anesthetic No     Social Drivers of Health      Received from [facility]    Housing Stability

## 2025-07-19 NOTE — PATIENT INSTRUCTIONS
Monitor symptoms  You may take antihistamines for itching if needed.   If new symptoms develop or existing symptoms become severe, follow-up with your PCP or go to the ER for urgent evaluation.

## 2025-08-15 ENCOUNTER — TELEPHONE (OUTPATIENT)
Facility: CLINIC | Age: 40
End: 2025-08-15

## 2025-08-15 DIAGNOSIS — D50.9 IRON DEFICIENCY ANEMIA, UNSPECIFIED IRON DEFICIENCY ANEMIA TYPE: ICD-10-CM

## 2025-08-15 DIAGNOSIS — Z86.0100 HX OF COLONIC POLYPS: Primary | ICD-10-CM

## 2025-08-15 DIAGNOSIS — K22.70 BARRETT'S ESOPHAGUS WITHOUT DYSPLASIA: ICD-10-CM

## (undated) DIAGNOSIS — D75.1 POLYCYTHEMIA: Primary | ICD-10-CM

## (undated) DEVICE — SNARE ENDOSCOPIC 10MM ROUND

## (undated) DEVICE — CONMED SCOPE SAVER BITE BLOCK, 20X27 MM: Brand: SCOPE SAVER

## (undated) DEVICE — SNARE OPTMZ PLPCTM TRP

## (undated) DEVICE — FORCEP RADIAL JAW 4

## (undated) DEVICE — MEDI-VAC NON-CONDUCTIVE SUCTION TUBING 6MM X 1.8M (6FT.) L: Brand: CARDINAL HEALTH

## (undated) DEVICE — LINE MNTR ADLT SET O2 INTMD

## (undated) DEVICE — KIT CLEAN ENDOKIT 1.1OZ GOWNX2

## (undated) DEVICE — 35 ML SYRINGE REGULAR TIP: Brand: MONOJECT

## (undated) DEVICE — KIT ENDO ORCAPOD 160/180/190

## (undated) NOTE — LETTER
Marcela Zayas Md  [address], [address] [facility] [address]       09/13/18        Patient: Gato Raman   YOB: 1985   Date of Visit: 9/13/2018       Dear  Dr. Katharine Olmstead MD,      Thank you for referring Gato Raman to my prac

## (undated) NOTE — LETTER
[address]  Authorization for Invasive Procedure                                                                                           1. I hereby Jonny Hudson MD, my physician and his/her assistants (if applicable), which may include medical students, residents, and/or fellows, to perform the following surgical operation/ procedure and administer such anesthesia as may be determined necessary by my physician: Operation/Procedure name (s) COLONOSCOPY/ESOPHAGOGASTRODUODENOSCOPY (EGD) on [address]   2. I recognize that during the surgical operation/procedure, unforeseen conditions may necessitate additional or different procedures than those listed above. I, therefore, further authorize and request that the above-named surgeon, assistants, or designees perform such procedures as are, in their judgment, necessary and desirable. 3.   My surgeon/physician has discussed prior to my surgery the potential benefits, risks and side effects of this procedure; the likelihood of achieving goals; and potential problems that might occur during recuperation. They also discussed reasonable alternatives to the procedure, including risks, benefits, and side effects related to the alternatives and risks related to not receiving this procedure. I have had all my questions answered and I acknowledge that no guarantee has been made as to the result that may be obtained. 4.   Should the need arise during my operation/procedure, which includes change of level of care prior to discharge, I also consent to the administration of blood and/or blood products. Further, I understand that despite careful testing and screening of blood or blood products by collecting agencies, I may still be subject to ill effects as a result of receiving a blood transfusion and/or blood products.   The following are some, but not all, of the potential risks that can occur: fever and allergic reactions, hemolytic reactions, transmission of diseases such as Hepatitis, AIDS and Cytomegalovirus (CMV) and fluid overload. In the event that I wish to have an autologous transfusion of my own blood, or a directed donor transfusion, I will discuss this with my physician. Check only if Refusing Blood or Blood Products  I understand refusal of blood or blood products as deemed necessary by my physician may have serious consequences to my condition to include possible death. I hereby assume responsibility for my refusal and release the hospital, its personnel, and my physicians from any responsibility for the consequences of my refusal.           ____ Refuse      5. I authorize the use of any specimen, organs, tissues, body parts or foreign objects that may be removed from my body during the operation/procedure for diagnosis, research or teaching purposes and their subsequent disposal by hospital authorities. I also authorize the release of specimen test results and/or written reports to my treating physician on the hospital medical staff or other referring or consulting physicians involved in my care, at the discretion of the Pathologist or my treating physician. 6.   I consent to the photographing or videotaping of the operations or procedures to be performed, including appropriate portions of my body for medical, scientific, or educational purposes, provided my identity is not revealed by the pictures or by descriptive texts accompanying them. If the procedure has been photographed/videotaped, the surgeon will obtain the original picture, image, videotape or CD. The hospital will not be responsible for storage, release or maintenance of the picture, image, tape or CD.    7.   I consent to the presence of a  or observers in the operating room as deemed necessary by my physician or their designees.     8.   I recognize that in the event my procedure results in extended X-Ray/fluoroscopy time, I may develop a skin reaction. 9. If I have a Do Not Attempt Resuscitation (DNAR) order in place, that status will be suspended while in the operating room, procedural suite, and during the recovery period unless otherwise explicitly stated by me (or a person authorized to consent on my behalf). The surgeon or my attending physician will determine when the applicable recovery period ends for purposes of reinstating the DNAR order. 10. Patients having a sterilization procedure: I understand that if the procedure is successful the results will be permanent and it will therefore be impossible for me to inseminate, conceive, or bear children. I also understand that the procedure is intended to result in sterility, although the result has not been guaranteed. 11. I acknowledge that my physician has explained sedation/analgesia administration to me including the risk and benefits I consent to the administration of sedation/analgesia as may be necessary or desirable in the judgment of my physician. I CERTIFY THAT I HAVE READ AND FULLY UNDERSTAND THE ABOVE CONSENT TO OPERATION and/or OTHER PROCEDURE.     _________________________________________ _________________________________     ___________________________________  Signature of Patient     Signature of Responsible Person                   Printed Name of Responsible Person                              _________________________________________ ______________________________        ___________________________________  Signature of Witness         Date  Time         Relationship to Patient    STATEMENT OF PHYSICIAN My signature below affirms that prior to the time of the procedure; I have explained to the patient and/or his/her legal representative, the risks and benefits involved in the proposed treatment and any reasonable alternative to the proposed treatment.  I have also explained the risks and benefits involved in refusal of the proposed treatment and alternatives to the proposed treatment and have answered the patient's questions.  If I have a significant financial interest in a co-management agreement or a significant financial interest in any product or implant, or other significant relationship used in this procedure/surgery, I have disclosed this and had a discussion with my patient.     _______________________________________________________________ _____________________________  Rupa Lincoln)                                                                                         (Date)                                   (Time)  Patient Name: Emma Chavez    : 3/31/1985   Printed: 10/29/2022      Medical Record #: [id number]                                              Page 1 of 1

## (undated) NOTE — LETTER
3/12/2018              [address] [facility]        [place] [address]         Dear Chuckie Capellan,      The report of the Biopsy done on 03/08/18 shows a ruptured cyst.  This is a benign (not cancerous) growth, and requires no further iraj